# Patient Record
Sex: MALE | Race: WHITE | NOT HISPANIC OR LATINO | URBAN - METROPOLITAN AREA
[De-identification: names, ages, dates, MRNs, and addresses within clinical notes are randomized per-mention and may not be internally consistent; named-entity substitution may affect disease eponyms.]

---

## 2017-01-10 ENCOUNTER — OFFICE VISIT - RIVER FALLS (OUTPATIENT)
Dept: FAMILY MEDICINE | Facility: CLINIC | Age: 57
End: 2017-01-10

## 2017-01-10 ASSESSMENT — MIFFLIN-ST. JEOR: SCORE: 2165.24

## 2017-01-24 ENCOUNTER — OFFICE VISIT - RIVER FALLS (OUTPATIENT)
Dept: FAMILY MEDICINE | Facility: CLINIC | Age: 57
End: 2017-01-24

## 2017-01-24 ASSESSMENT — MIFFLIN-ST. JEOR: SCORE: 2165.24

## 2017-05-10 ENCOUNTER — OFFICE VISIT - RIVER FALLS (OUTPATIENT)
Dept: FAMILY MEDICINE | Facility: CLINIC | Age: 57
End: 2017-05-10

## 2017-09-01 ENCOUNTER — OFFICE VISIT - RIVER FALLS (OUTPATIENT)
Dept: FAMILY MEDICINE | Facility: CLINIC | Age: 57
End: 2017-09-01

## 2017-09-01 ASSESSMENT — MIFFLIN-ST. JEOR: SCORE: 2200.62

## 2017-09-14 ENCOUNTER — OFFICE VISIT - RIVER FALLS (OUTPATIENT)
Dept: FAMILY MEDICINE | Facility: CLINIC | Age: 57
End: 2017-09-14

## 2017-09-29 ENCOUNTER — OFFICE VISIT - RIVER FALLS (OUTPATIENT)
Dept: FAMILY MEDICINE | Facility: CLINIC | Age: 57
End: 2017-09-29

## 2018-03-20 ENCOUNTER — OFFICE VISIT - RIVER FALLS (OUTPATIENT)
Dept: FAMILY MEDICINE | Facility: CLINIC | Age: 58
End: 2018-03-20

## 2018-03-20 ASSESSMENT — MIFFLIN-ST. JEOR: SCORE: 2216.95

## 2018-03-21 LAB
CHOLEST SERPL-MCNC: 172 MG/DL
CHOLEST/HDLC SERPL: 4.8 {RATIO}
CREAT SERPL-MCNC: 0.9 MG/DL (ref 0.7–1.33)
GLUCOSE BLD-MCNC: 91 MG/DL (ref 65–99)
HDLC SERPL-MCNC: 36 MG/DL
LDLC SERPL CALC-MCNC: 93 MG/DL
NONHDLC SERPL-MCNC: 136 MG/DL
PSA SERPL-MCNC: 0.6 NG/ML
TRIGL SERPL-MCNC: 322 MG/DL

## 2018-07-23 ENCOUNTER — OFFICE VISIT - RIVER FALLS (OUTPATIENT)
Dept: FAMILY MEDICINE | Facility: CLINIC | Age: 58
End: 2018-07-23

## 2018-07-23 ASSESSMENT — MIFFLIN-ST. JEOR: SCORE: 2215.14

## 2018-09-19 ENCOUNTER — OFFICE VISIT - RIVER FALLS (OUTPATIENT)
Dept: FAMILY MEDICINE | Facility: CLINIC | Age: 58
End: 2018-09-19

## 2018-09-19 ASSESSMENT — MIFFLIN-ST. JEOR: SCORE: 2196.99

## 2018-10-15 ENCOUNTER — OFFICE VISIT - RIVER FALLS (OUTPATIENT)
Dept: FAMILY MEDICINE | Facility: CLINIC | Age: 58
End: 2018-10-15

## 2018-10-16 ENCOUNTER — OFFICE VISIT - RIVER FALLS (OUTPATIENT)
Dept: FAMILY MEDICINE | Facility: CLINIC | Age: 58
End: 2018-10-16

## 2018-10-16 ASSESSMENT — MIFFLIN-ST. JEOR: SCORE: 2196.99

## 2018-10-26 ENCOUNTER — OFFICE VISIT - RIVER FALLS (OUTPATIENT)
Dept: FAMILY MEDICINE | Facility: CLINIC | Age: 58
End: 2018-10-26

## 2019-02-05 ENCOUNTER — OFFICE VISIT - RIVER FALLS (OUTPATIENT)
Dept: FAMILY MEDICINE | Facility: CLINIC | Age: 59
End: 2019-02-05

## 2019-02-05 ASSESSMENT — MIFFLIN-ST. JEOR: SCORE: 2230.56

## 2019-02-06 LAB
A/G RATIO - HISTORICAL: 1.3 (ref 1–2.5)
ALBUMIN SERPL-MCNC: 4.1 GM/DL (ref 3.6–5.1)
ALP SERPL-CCNC: 77 UNIT/L (ref 40–115)
ALT SERPL W P-5'-P-CCNC: 26 UNIT/L (ref 9–46)
AST SERPL W P-5'-P-CCNC: 22 UNIT/L (ref 10–35)
BILIRUB SERPL-MCNC: 0.6 MG/DL (ref 0.2–1.2)
BUN SERPL-MCNC: 20 MG/DL (ref 7–25)
BUN/CREAT RATIO - HISTORICAL: ABNORMAL (ref 6–22)
CALCIUM SERPL-MCNC: 9 MG/DL (ref 8.6–10.3)
CHLORIDE BLD-SCNC: 109 MMOL/L (ref 98–110)
CHOLEST SERPL-MCNC: 150 MG/DL
CHOLEST/HDLC SERPL: 3.8 {RATIO}
CO2 SERPL-SCNC: 21 MMOL/L (ref 20–32)
CREAT SERPL-MCNC: 0.74 MG/DL (ref 0.7–1.33)
EGFRCR SERPLBLD CKD-EPI 2021: 102 ML/MIN/1.73M2
ERYTHROCYTE [DISTWIDTH] IN BLOOD BY AUTOMATED COUNT: 13.7 % (ref 11–15)
GLOBULIN: 3.1 (ref 1.9–3.7)
GLUCOSE BLD-MCNC: 100 MG/DL (ref 65–99)
HCT VFR BLD AUTO: 40.2 % (ref 38.5–50)
HDLC SERPL-MCNC: 39 MG/DL
HGB BLD-MCNC: 13.9 GM/DL (ref 13.2–17.1)
LDLC SERPL CALC-MCNC: 67 MG/DL
MCH RBC QN AUTO: 29.1 PG (ref 27–33)
MCHC RBC AUTO-ENTMCNC: 34.6 GM/DL (ref 32–36)
MCV RBC AUTO: 84.3 FL (ref 80–100)
NONHDLC SERPL-MCNC: 111 MG/DL
PLATELET # BLD AUTO: 157 10*3/UL (ref 140–400)
PMV BLD: 11.4 FL (ref 7.5–12.5)
POTASSIUM BLD-SCNC: 4.1 MMOL/L (ref 3.5–5.3)
PROT SERPL-MCNC: 7.2 GM/DL (ref 6.1–8.1)
PSA SERPL-MCNC: 0.6 NG/ML
RBC # BLD AUTO: 4.77 10*6/UL (ref 4.2–5.8)
SODIUM SERPL-SCNC: 142 MMOL/L (ref 135–146)
TRIGL SERPL-MCNC: 368 MG/DL
WBC # BLD AUTO: 4.7 10*3/UL (ref 3.8–10.8)

## 2019-03-25 ENCOUNTER — OFFICE VISIT - RIVER FALLS (OUTPATIENT)
Dept: FAMILY MEDICINE | Facility: CLINIC | Age: 59
End: 2019-03-25

## 2019-09-04 ENCOUNTER — COMMUNICATION - RIVER FALLS (OUTPATIENT)
Dept: FAMILY MEDICINE | Facility: CLINIC | Age: 59
End: 2019-09-04

## 2019-09-16 ENCOUNTER — COMMUNICATION - RIVER FALLS (OUTPATIENT)
Dept: FAMILY MEDICINE | Facility: CLINIC | Age: 59
End: 2019-09-16

## 2019-09-16 ENCOUNTER — OFFICE VISIT - RIVER FALLS (OUTPATIENT)
Dept: FAMILY MEDICINE | Facility: CLINIC | Age: 59
End: 2019-09-16

## 2019-09-16 ASSESSMENT — MIFFLIN-ST. JEOR: SCORE: 2228.74

## 2019-09-17 ENCOUNTER — COMMUNICATION - RIVER FALLS (OUTPATIENT)
Dept: FAMILY MEDICINE | Facility: CLINIC | Age: 59
End: 2019-09-17

## 2019-09-17 LAB
A/G RATIO - HISTORICAL: 1.4 (ref 1–2.5)
ALBUMIN SERPL-MCNC: 4 GM/DL (ref 3.6–5.1)
ALP SERPL-CCNC: 81 UNIT/L (ref 40–115)
ALT SERPL W P-5'-P-CCNC: 18 UNIT/L (ref 9–46)
AST SERPL W P-5'-P-CCNC: 17 UNIT/L (ref 10–35)
BILIRUB SERPL-MCNC: 0.6 MG/DL (ref 0.2–1.2)
BUN SERPL-MCNC: 20 MG/DL (ref 7–25)
BUN/CREAT RATIO - HISTORICAL: NORMAL (ref 6–22)
CALCIUM SERPL-MCNC: 8.8 MG/DL (ref 8.6–10.3)
CHLORIDE BLD-SCNC: 108 MMOL/L (ref 98–110)
CHOLEST SERPL-MCNC: 202 MG/DL
CHOLEST/HDLC SERPL: 5.8 {RATIO}
CO2 SERPL-SCNC: 23 MMOL/L (ref 20–32)
CREAT SERPL-MCNC: 0.86 MG/DL (ref 0.7–1.33)
EGFRCR SERPLBLD CKD-EPI 2021: 96 ML/MIN/1.73M2
ERYTHROCYTE [DISTWIDTH] IN BLOOD BY AUTOMATED COUNT: 13.8 % (ref 11–15)
GLOBULIN: 2.9 (ref 1.9–3.7)
GLUCOSE BLD-MCNC: 92 MG/DL (ref 65–99)
HCT VFR BLD AUTO: 40.4 % (ref 38.5–50)
HDLC SERPL-MCNC: 35 MG/DL
HGB BLD-MCNC: 14.1 GM/DL (ref 13.2–17.1)
LDLC SERPL CALC-MCNC: 51 MG/DL
LDLC SERPL CALC-MCNC: ABNORMAL MG/DL
MCH RBC QN AUTO: 30.1 PG (ref 27–33)
MCHC RBC AUTO-ENTMCNC: 34.9 GM/DL (ref 32–36)
MCV RBC AUTO: 86.1 FL (ref 80–100)
NONHDLC SERPL-MCNC: 167 MG/DL
PLATELET # BLD AUTO: 169 10*3/UL (ref 140–400)
PMV BLD: 10.7 FL (ref 7.5–12.5)
POTASSIUM BLD-SCNC: 3.8 MMOL/L (ref 3.5–5.3)
PROT SERPL-MCNC: 6.9 GM/DL (ref 6.1–8.1)
RBC # BLD AUTO: 4.69 10*6/UL (ref 4.2–5.8)
SODIUM SERPL-SCNC: 139 MMOL/L (ref 135–146)
TRIGL SERPL-MCNC: 935 MG/DL
WBC # BLD AUTO: 5 10*3/UL (ref 3.8–10.8)

## 2020-01-23 ENCOUNTER — OFFICE VISIT - RIVER FALLS (OUTPATIENT)
Dept: FAMILY MEDICINE | Facility: CLINIC | Age: 60
End: 2020-01-23

## 2020-01-23 ENCOUNTER — AMBULATORY - RIVER FALLS (OUTPATIENT)
Dept: FAMILY MEDICINE | Facility: CLINIC | Age: 60
End: 2020-01-23

## 2020-01-23 ASSESSMENT — MIFFLIN-ST. JEOR: SCORE: 2228.74

## 2020-01-24 LAB
A/G RATIO - HISTORICAL: 1.5 (ref 1–2.5)
ALBUMIN SERPL-MCNC: 4.4 GM/DL (ref 3.6–5.1)
ALP SERPL-CCNC: 71 UNIT/L (ref 40–115)
ALT SERPL W P-5'-P-CCNC: 16 UNIT/L (ref 9–46)
AST SERPL W P-5'-P-CCNC: 16 UNIT/L (ref 10–35)
BILIRUB SERPL-MCNC: 0.6 MG/DL (ref 0.2–1.2)
BUN SERPL-MCNC: 23 MG/DL (ref 7–25)
BUN/CREAT RATIO - HISTORICAL: ABNORMAL (ref 6–22)
CALCIUM SERPL-MCNC: 9.3 MG/DL (ref 8.6–10.3)
CHLORIDE BLD-SCNC: 105 MMOL/L (ref 98–110)
CHOLEST SERPL-MCNC: 174 MG/DL
CHOLEST/HDLC SERPL: 4 {RATIO}
CO2 SERPL-SCNC: 26 MMOL/L (ref 20–32)
CREAT SERPL-MCNC: 0.93 MG/DL (ref 0.7–1.33)
EGFRCR SERPLBLD CKD-EPI 2021: 90 ML/MIN/1.73M2
GLOBULIN: 2.9 (ref 1.9–3.7)
GLUCOSE BLD-MCNC: 103 MG/DL (ref 65–99)
HDLC SERPL-MCNC: 44 MG/DL
LDLC SERPL CALC-MCNC: 98 MG/DL
NONHDLC SERPL-MCNC: 130 MG/DL
POTASSIUM BLD-SCNC: 3.9 MMOL/L (ref 3.5–5.3)
PROT SERPL-MCNC: 7.3 GM/DL (ref 6.1–8.1)
PSA SERPL-MCNC: 0.8 NG/ML
SODIUM SERPL-SCNC: 138 MMOL/L (ref 135–146)
TRIGL SERPL-MCNC: 197 MG/DL

## 2020-01-27 ENCOUNTER — COMMUNICATION - RIVER FALLS (OUTPATIENT)
Dept: FAMILY MEDICINE | Facility: CLINIC | Age: 60
End: 2020-01-27

## 2020-01-27 ENCOUNTER — OFFICE VISIT - RIVER FALLS (OUTPATIENT)
Dept: FAMILY MEDICINE | Facility: CLINIC | Age: 60
End: 2020-01-27

## 2020-01-27 ASSESSMENT — MIFFLIN-ST. JEOR: SCORE: 2228.74

## 2020-01-28 ENCOUNTER — OFFICE VISIT - RIVER FALLS (OUTPATIENT)
Dept: FAMILY MEDICINE | Facility: CLINIC | Age: 60
End: 2020-01-28

## 2020-01-28 ASSESSMENT — MIFFLIN-ST. JEOR: SCORE: 2228.74

## 2020-02-17 ENCOUNTER — OFFICE VISIT - RIVER FALLS (OUTPATIENT)
Dept: FAMILY MEDICINE | Facility: CLINIC | Age: 60
End: 2020-02-17

## 2020-02-17 ASSESSMENT — MIFFLIN-ST. JEOR: SCORE: 2228.74

## 2020-03-02 ENCOUNTER — COMMUNICATION - RIVER FALLS (OUTPATIENT)
Dept: FAMILY MEDICINE | Facility: CLINIC | Age: 60
End: 2020-03-02

## 2020-04-06 ENCOUNTER — COMMUNICATION - RIVER FALLS (OUTPATIENT)
Dept: FAMILY MEDICINE | Facility: CLINIC | Age: 60
End: 2020-04-06

## 2020-06-24 ENCOUNTER — OFFICE VISIT - RIVER FALLS (OUTPATIENT)
Dept: FAMILY MEDICINE | Facility: CLINIC | Age: 60
End: 2020-06-24

## 2020-06-24 ASSESSMENT — MIFFLIN-ST. JEOR: SCORE: 2195.18

## 2020-08-17 ENCOUNTER — OFFICE VISIT - RIVER FALLS (OUTPATIENT)
Dept: FAMILY MEDICINE | Facility: CLINIC | Age: 60
End: 2020-08-17

## 2020-08-17 ENCOUNTER — COMMUNICATION - RIVER FALLS (OUTPATIENT)
Dept: FAMILY MEDICINE | Facility: CLINIC | Age: 60
End: 2020-08-17

## 2020-08-17 ASSESSMENT — MIFFLIN-ST. JEOR: SCORE: 2192.46

## 2020-10-06 ENCOUNTER — COMMUNICATION - RIVER FALLS (OUTPATIENT)
Dept: FAMILY MEDICINE | Facility: CLINIC | Age: 60
End: 2020-10-06

## 2020-10-27 ENCOUNTER — OFFICE VISIT - RIVER FALLS (OUTPATIENT)
Dept: FAMILY MEDICINE | Facility: CLINIC | Age: 60
End: 2020-10-27

## 2020-10-27 ASSESSMENT — MIFFLIN-ST. JEOR: SCORE: 2192.46

## 2020-10-28 ENCOUNTER — COMMUNICATION - RIVER FALLS (OUTPATIENT)
Dept: FAMILY MEDICINE | Facility: CLINIC | Age: 60
End: 2020-10-28

## 2020-10-28 LAB
A/G RATIO - HISTORICAL: 1.5 (ref 1–2.5)
ALBUMIN SERPL-MCNC: 4.5 GM/DL (ref 3.6–5.1)
ALP SERPL-CCNC: 80 UNIT/L (ref 35–144)
ALT SERPL W P-5'-P-CCNC: 19 UNIT/L (ref 9–46)
AST SERPL W P-5'-P-CCNC: 17 UNIT/L (ref 10–35)
BILIRUB SERPL-MCNC: 0.5 MG/DL (ref 0.2–1.2)
BUN SERPL-MCNC: 18 MG/DL (ref 7–25)
BUN/CREAT RATIO - HISTORICAL: ABNORMAL (ref 6–22)
CALCIUM SERPL-MCNC: 9.4 MG/DL (ref 8.6–10.3)
CHLORIDE BLD-SCNC: 104 MMOL/L (ref 98–110)
CHOLEST SERPL-MCNC: 160 MG/DL
CHOLEST/HDLC SERPL: 3.5 {RATIO}
CO2 SERPL-SCNC: 24 MMOL/L (ref 20–32)
CREAT SERPL-MCNC: 0.84 MG/DL (ref 0.7–1.33)
EGFRCR SERPLBLD CKD-EPI 2021: 96 ML/MIN/1.73M2
ERYTHROCYTE [DISTWIDTH] IN BLOOD BY AUTOMATED COUNT: 12.8 % (ref 11–15)
GLOBULIN: 3.1 (ref 1.9–3.7)
GLUCOSE BLD-MCNC: 102 MG/DL (ref 65–99)
HCT VFR BLD AUTO: 40.7 % (ref 38.5–50)
HDLC SERPL-MCNC: 46 MG/DL
HGB BLD-MCNC: 14.4 GM/DL (ref 13.2–17.1)
LDLC SERPL CALC-MCNC: 81 MG/DL
MCH RBC QN AUTO: 29.8 PG (ref 27–33)
MCHC RBC AUTO-ENTMCNC: 35.4 GM/DL (ref 32–36)
MCV RBC AUTO: 84.3 FL (ref 80–100)
NONHDLC SERPL-MCNC: 114 MG/DL
PLATELET # BLD AUTO: 231 10*3/UL (ref 140–400)
PMV BLD: 11.2 FL (ref 7.5–12.5)
POTASSIUM BLD-SCNC: 4.1 MMOL/L (ref 3.5–5.3)
PROT SERPL-MCNC: 7.6 GM/DL (ref 6.1–8.1)
RBC # BLD AUTO: 4.83 10*6/UL (ref 4.2–5.8)
SODIUM SERPL-SCNC: 138 MMOL/L (ref 135–146)
TRIGL SERPL-MCNC: 249 MG/DL
TSH SERPL DL<=0.005 MIU/L-ACNC: 3.59 MIU/L (ref 0.4–4.5)
WBC # BLD AUTO: 4.7 10*3/UL (ref 3.8–10.8)

## 2020-11-23 ENCOUNTER — OFFICE VISIT - RIVER FALLS (OUTPATIENT)
Dept: FAMILY MEDICINE | Facility: CLINIC | Age: 60
End: 2020-11-23

## 2020-11-23 ASSESSMENT — MIFFLIN-ST. JEOR: SCORE: 2187.92

## 2021-02-16 ENCOUNTER — OFFICE VISIT - RIVER FALLS (OUTPATIENT)
Dept: FAMILY MEDICINE | Facility: CLINIC | Age: 61
End: 2021-02-16

## 2021-02-16 ASSESSMENT — MIFFLIN-ST. JEOR: SCORE: 2161.61

## 2021-02-17 ENCOUNTER — COMMUNICATION - RIVER FALLS (OUTPATIENT)
Dept: FAMILY MEDICINE | Facility: CLINIC | Age: 61
End: 2021-02-17

## 2021-02-17 LAB — PSA SERPL-MCNC: 0.6 NG/ML

## 2022-02-12 VITALS
HEART RATE: 76 BPM | BODY MASS INDEX: 42.9 KG/M2 | DIASTOLIC BLOOD PRESSURE: 86 MMHG | SYSTOLIC BLOOD PRESSURE: 130 MMHG | WEIGHT: 306.4 LBS | HEIGHT: 71 IN

## 2022-02-12 VITALS
BODY MASS INDEX: 41.3 KG/M2 | SYSTOLIC BLOOD PRESSURE: 136 MMHG | DIASTOLIC BLOOD PRESSURE: 82 MMHG | SYSTOLIC BLOOD PRESSURE: 124 MMHG | BODY MASS INDEX: 41.3 KG/M2 | WEIGHT: 295 LBS | HEIGHT: 71 IN | DIASTOLIC BLOOD PRESSURE: 80 MMHG | WEIGHT: 295 LBS | HEIGHT: 71 IN | HEART RATE: 72 BPM

## 2022-02-12 VITALS
SYSTOLIC BLOOD PRESSURE: 122 MMHG | HEART RATE: 78 BPM | BODY MASS INDEX: 43.26 KG/M2 | DIASTOLIC BLOOD PRESSURE: 62 MMHG | BODY MASS INDEX: 43.26 KG/M2 | SYSTOLIC BLOOD PRESSURE: 128 MMHG | HEART RATE: 70 BPM | HEIGHT: 71 IN | WEIGHT: 309 LBS | DIASTOLIC BLOOD PRESSURE: 72 MMHG | OXYGEN SATURATION: 98 % | OXYGEN SATURATION: 97 % | HEIGHT: 71 IN | DIASTOLIC BLOOD PRESSURE: 70 MMHG | WEIGHT: 309 LBS | SYSTOLIC BLOOD PRESSURE: 136 MMHG | HEIGHT: 71 IN | WEIGHT: 309 LBS | BODY MASS INDEX: 43.26 KG/M2 | OXYGEN SATURATION: 96 % | WEIGHT: 309 LBS | BODY MASS INDEX: 43.26 KG/M2 | HEIGHT: 71 IN | HEART RATE: 71 BPM

## 2022-02-12 VITALS
OXYGEN SATURATION: 98 % | HEIGHT: 71 IN | BODY MASS INDEX: 42.14 KG/M2 | HEART RATE: 84 BPM | SYSTOLIC BLOOD PRESSURE: 146 MMHG | DIASTOLIC BLOOD PRESSURE: 78 MMHG | WEIGHT: 301 LBS

## 2022-02-12 VITALS
OXYGEN SATURATION: 97 % | HEIGHT: 71 IN | WEIGHT: 309.4 LBS | HEART RATE: 66 BPM | BODY MASS INDEX: 43.31 KG/M2 | DIASTOLIC BLOOD PRESSURE: 88 MMHG | SYSTOLIC BLOOD PRESSURE: 136 MMHG

## 2022-02-12 VITALS
DIASTOLIC BLOOD PRESSURE: 70 MMHG | SYSTOLIC BLOOD PRESSURE: 136 MMHG | HEIGHT: 71 IN | WEIGHT: 294.2 LBS | BODY MASS INDEX: 41.19 KG/M2 | HEART RATE: 99 BPM | OXYGEN SATURATION: 97 %

## 2022-02-12 VITALS
BODY MASS INDEX: 42.54 KG/M2 | HEIGHT: 71 IN | SYSTOLIC BLOOD PRESSURE: 126 MMHG | HEART RATE: 79 BPM | HEART RATE: 70 BPM | BODY MASS INDEX: 42.28 KG/M2 | WEIGHT: 305 LBS | OXYGEN SATURATION: 97 % | DIASTOLIC BLOOD PRESSURE: 74 MMHG | SYSTOLIC BLOOD PRESSURE: 122 MMHG | HEIGHT: 71 IN | DIASTOLIC BLOOD PRESSURE: 64 MMHG | SYSTOLIC BLOOD PRESSURE: 130 MMHG | OXYGEN SATURATION: 96 % | BODY MASS INDEX: 42.28 KG/M2 | WEIGHT: 302 LBS | DIASTOLIC BLOOD PRESSURE: 74 MMHG | TEMPERATURE: 98.7 F | HEART RATE: 84 BPM | OXYGEN SATURATION: 96 % | WEIGHT: 302 LBS

## 2022-02-12 VITALS
HEART RATE: 74 BPM | WEIGHT: 302.8 LBS | BODY MASS INDEX: 42.39 KG/M2 | HEIGHT: 71 IN | SYSTOLIC BLOOD PRESSURE: 116 MMHG | DIASTOLIC BLOOD PRESSURE: 80 MMHG | TEMPERATURE: 98.9 F

## 2022-02-12 VITALS
HEIGHT: 71 IN | WEIGHT: 306 LBS | SYSTOLIC BLOOD PRESSURE: 164 MMHG | BODY MASS INDEX: 42.84 KG/M2 | DIASTOLIC BLOOD PRESSURE: 90 MMHG | HEART RATE: 75 BPM

## 2022-02-12 VITALS
DIASTOLIC BLOOD PRESSURE: 82 MMHG | WEIGHT: 309 LBS | HEIGHT: 71 IN | HEART RATE: 66 BPM | SYSTOLIC BLOOD PRESSURE: 122 MMHG | OXYGEN SATURATION: 97 % | BODY MASS INDEX: 43.26 KG/M2

## 2022-02-12 VITALS
OXYGEN SATURATION: 98 % | DIASTOLIC BLOOD PRESSURE: 78 MMHG | BODY MASS INDEX: 42.14 KG/M2 | HEIGHT: 71 IN | WEIGHT: 301 LBS | TEMPERATURE: 97.5 F | HEIGHT: 71 IN | SYSTOLIC BLOOD PRESSURE: 122 MMHG | HEART RATE: 67 BPM | DIASTOLIC BLOOD PRESSURE: 66 MMHG | SYSTOLIC BLOOD PRESSURE: 130 MMHG | BODY MASS INDEX: 42.22 KG/M2 | TEMPERATURE: 97.4 F | HEART RATE: 87 BPM | WEIGHT: 301.6 LBS

## 2022-02-12 VITALS
DIASTOLIC BLOOD PRESSURE: 82 MMHG | SYSTOLIC BLOOD PRESSURE: 129 MMHG | WEIGHT: 302.8 LBS | TEMPERATURE: 98.1 F | BODY MASS INDEX: 42.23 KG/M2 | HEART RATE: 75 BPM

## 2022-02-12 VITALS
DIASTOLIC BLOOD PRESSURE: 74 MMHG | BODY MASS INDEX: 42 KG/M2 | OXYGEN SATURATION: 95 % | HEIGHT: 71 IN | TEMPERATURE: 97.7 F | WEIGHT: 300 LBS | SYSTOLIC BLOOD PRESSURE: 140 MMHG | HEART RATE: 65 BPM

## 2022-02-12 VITALS
DIASTOLIC BLOOD PRESSURE: 76 MMHG | BODY MASS INDEX: 42.48 KG/M2 | SYSTOLIC BLOOD PRESSURE: 124 MMHG | HEART RATE: 68 BPM | WEIGHT: 304.6 LBS

## 2022-02-15 NOTE — NURSING NOTE
Comprehensive Intake Entered On:  10/27/2020 10:58 AM CDT    Performed On:  10/27/2020 10:54 AM CDT by Sarah Cervantes CMA               Summary   Chief Complaint :   Pt here for WC pre op... DOS 11/16/20 at [facility] with Dr Tinoco for left knee replacement   Weight Measured :   301 lb(Converted to: 301 lb 0 oz, 136.531 kg)    Height Measured :   71 in(Converted to: 5 ft 11 in, 180.34 cm)    Body Mass Index :   41.98 kg/m2 (HI)    Body Surface Area :   2.61 m2   Height/Length Estimated :   71 in(Converted to: 5 ft 11 in, 180.34 cm)    Systolic Blood Pressure :   146 mmHg (HI)    Diastolic Blood Pressure :   78 mmHg   Mean Arterial Pressure :   101 mmHg   Peripheral Pulse Rate :   84 bpm   Oxygen Saturation :   98 %   Race :      Languages :   English   Ethnicity :   Not  or    Sarah Cervantes CMA - 10/27/2020 10:54 AM CDT   Health Status   Allergies Verified? :   Yes   Medication History Verified? :   Yes   Immunizations Current :   Yes   Medical History Verified? :   Yes   Pre-Visit Planning Status :   Completed   Tobacco Use? :   Former smoker   Sarah Cervantes CMA - 10/27/2020 10:54 AM CDT   Consents   Consent for Immunization Exchange :   Consent Granted   Consent for Immunizations to Providers :   Consent Granted   Sarah Cervantes CMA - 10/27/2020 10:54 AM CDT   Meds / Allergies   (As Of: 10/27/2020 10:58:47 AM CDT)   Allergies (Active)   No Known Medication Allergies  Estimated Onset Date:   Unspecified ; Created By:   Elise Lubin LPN; Reaction Status:   Active ; Category:   Drug ; Substance:   No Known Medication Allergies ; Type:   Allergy ; Updated By:   Elise Lubin LPN; Reviewed Date:   10/27/2020 10:55 AM CDT        Medication List   (As Of: 10/27/2020 10:58:47 AM CDT)   Prescription/Discharge Order    atorvastatin  :   atorvastatin ; Status:   Prescribed ; Ordered As Mnemonic:   atorvastatin 20 mg oral tablet ; Simple Display Line:   20 mg, 1 tab(s), Oral, daily, 90  tab(s), 1 Refill(s) ; Ordering Provider:   Castillo Nogueira MD; Catalog Code:   atorvastatin ; Order Dt/Tm:   10/6/2020 10:16:26 AM CDT          cephalexin  :   cephalexin ; Status:   Processing ; Ordered As Mnemonic:   cephalexin 500 mg oral capsule ; Ordering Provider:   Castillo Nogueira MD; Action Display:   Complete ; Catalog Code:   cephalexin ; Order Dt/Tm:   10/27/2020 10:55:07 AM CDT          gemfibrozil  :   gemfibrozil ; Status:   Prescribed ; Ordered As Mnemonic:   gemfibrozil 600 mg oral tablet ; Simple Display Line:   See Instructions, TAKE ONE (1) TABLET BY MOUTH TWO (2) TIMES, 180 tab(s), 0 Refill(s) ; Ordering Provider:   Castillo Nogueira MD; Catalog Code:   gemfibrozil ; Order Dt/Tm:   8/6/2020 1:17:38 PM CDT          losartan  :   losartan ; Status:   Prescribed ; Ordered As Mnemonic:   losartan 50 mg oral tablet ; Simple Display Line:   50 mg, 1 tab(s), Oral, daily, 90 tab(s), 1 Refill(s) ; Ordering Provider:   Castillo Nogueira MD; Catalog Code:   losartan ; Order Dt/Tm:   8/17/2020 4:05:55 PM CDT          sildenafil  :   sildenafil ; Status:   Prescribed ; Ordered As Mnemonic:   sildenafil 20 mg oral tablet ; Simple Display Line:   40 mg, 2 tab(s), Oral, prn, as needed for planned sexual activity, PRN: Other (see comment), 20 tab(s), 5 Refill(s) ; Ordering Provider:   Castillo Nogueira MD; Catalog Code:   sildenafil ; Order Dt/Tm:   9/24/2019 2:56:03 PM CDT          sulfamethoxazole-trimethoprim  :   sulfamethoxazole-trimethoprim ; Status:   Processing ; Ordered As Mnemonic:   sulfamethoxazole-trimethoprim 800 mg-160 mg oral tablet ; Ordering Provider:   Castillo Nogueira MD; Action Display:   Complete ; Catalog Code:   sulfamethoxazole-trimethoprim ; Order Dt/Tm:   10/27/2020 10:55:07 AM CDT            ID Risk Screen   Recent Travel History :   No recent travel   Family Member Travel History :   No recent travel   Other Exposure to Infectious Disease :   Unknown   Sarah Cervantes CMA - 10/27/2020 10:54 AM  CDT

## 2022-02-15 NOTE — PROGRESS NOTES
Patient:   YAZMIN GRANADOS            MRN: [id number]            FIN: [id number]               Age:   60 years     Sex:  Male     :  1960   Associated Diagnoses:   Cellulitis   Author:   Castillo Nogueira MD      Impression and Plan   Diagnosis     Cellulitis (OIX26-VW L03.90).     Course:  Worsening.    Orders     Orders   Charges (Evaluation and Management):  20664 office outpatient visit 25 minutes (Charge) (Order): Quantity: 1, Cellulitis.     Orders (Selected)   Prescriptions  Prescribed  cephalexin 500 mg oral capsule: = 1 cap(s) ( 500 mg ), PO, qid, # 56 cap(s), 0 Refill(s), Type: Maintenance, Pharmacy: [facility], 1 cap(s) Oral qid,x14 day(s), 71, in, 20 15:40:00 CDT, Height Measured, 301, lb, 20 15:40:00 CDT, Weight Measured  sulfamethoxazole-trimethoprim 800 mg-160 mg oral tablet: 1 tab(s), PO, BID, # 28 tab(s), 0 Refill(s), Type: Maintenance, Pharmacy: [facility], 1 tab(s) Oral bid,x14 day(s), 71, in, 20 15:40:00 CDT, Height Measured, 301, lb, 20 15:40:00 CDT, Weight Measured.        Visit Information      Date of Service: 2020 03:35 pm  Performing Location: [facility]  Encounter#: [id number]      Primary Care Provider (PCP):  Castillo Nogueira MD    NPI# [id number]      Referring Provider:  Castillo Nogueira MD, NPI# [id number]   Visit type:  New symptom.    Accompanied by:  No one.    Source of history:  Self.    History limitation:  None.       Chief Complaint   2020 3:40 PM CDT    Pt here for cellulitis        History of Present Illness             The patient presents with skin lesion(s).  Associated symptoms consist of change in skin color, denies fatigue and denies fever.        Review of Systems   Constitutional:  No fever, No chills, No sweats, No weakness, No fatigue, No decreased activity.    Eye:  Negative.    Ear/Nose/Mouth/Throat:  Negative.    Respiratory:  Negative.    Cardiovascular:  No peripheral edema.    Gastrointestinal:   Negative.    Genitourinary:  Negative.    Hematology/Lymphatics:  No swollen lymph glands.    Endocrine:  Negative.    Immunologic:  Not immunocompromised, No recurrent fevers, No recurrent infections, No malaise.    Musculoskeletal:  Negative.    Integumentary:  Negative except as documented in history of present illness.    Neurologic:  No numbness, No tingling.    Psychiatric:  Negative.    All other systems reviewed and negative      Health Status   Allergies:    Allergic Reactions (Selected)  No Known Medication Allergies   Medications:  (Selected)   Prescriptions  Prescribed  atorvastatin 20 mg oral tablet: = 1 tab(s) ( 20 mg ), Oral, daily, # 90 tab(s), 1 Refill(s), Type: Maintenance, Pharmacy: [facility]  cephalexin 500 mg oral capsule: = 1 cap(s) ( 500 mg ), PO, qid, # 56 cap(s), 0 Refill(s), Type: Maintenance, Pharmacy: [facility], 1 cap(s) Oral qid,x14 day(s), 71, in, 08/17/20 15:40:00 CDT, Height Measured, 301, lb, 08/17/20 15:40:00 CDT, Weight Measured  gemfibrozil 600 mg oral tablet: See Instructions, Instructions: TAKE ONE (1) TABLET BY MOUTH TWO (2) TIMES, # 180 tab(s), 0 Refill(s), Type: Maintenance, Pharmacy: [facility], 71, in, 06/24/20 10:35:00 CDT, Height Measured, 301.6, lb, 06/24/20 10:35:00 CDT, Weight Measured  losartan 50 mg oral tablet: = 1 tab(s) ( 50 mg ), Oral, daily, # 90 tab(s), 1 Refill(s), Type: Maintenance, Pharmacy: [facility]  sildenafil 20 mg oral tablet: = 2 tab(s) ( 40 mg ), Oral, prn, Instructions: as needed for planned sexual activity, PRN: Other (see comment), # 20 tab(s), 5 Refill(s), Type: Maintenance, Pharmacy: [facility], 2 tab(s) Oral prn,PRN:Other (see comment),Instr:as needed for pl...  sulfamethoxazole-trimethoprim 800 mg-160 mg oral tablet: 1 tab(s), PO, BID, # 28 tab(s), 0 Refill(s), Type: Maintenance, Pharmacy: [facility], 1 tab(s) Oral bid,x14 day(s), 71, in, 08/17/20 15:40:00 CDT, Height Measured, 301, lb,  08/17/20 15:40:00 CDT, Weight Measured   Problem list:    All Problems  Anal Fissure / ICD-9-.0 / Confirmed  Benign essential HTN / SNOMED CT 3666231 / Confirmed  CAD (coronary artery disease) / SNOMED CT 0620979386 / Confirmed  Cervical radiculopathy at C6 / SNOMED CT 404342019 / Confirmed  Erectile Dysfunction / SNOMED CT 854798981 / Confirmed  Hypertriglyceridemia / ICD-9-.1 / Confirmed  Morbid obesity / SNOMED CT 323760534 / Confirmed  Obese / ICD-9-.00 / Probable  Osteoarthritis / ICD-9-.90 / Confirmed  Inactive: GERD (Gastroesophageal Reflux Disease) / ICD-9-.81  Resolved: *Hospitalized@[facility] - Cellulitis LLE  Resolved: Arthroscopy of knee / SNOMED CT 179977542  Resolved: Arthroscopy of shoulder / SNOMED CT 286922650  Resolved: Hip replacement / SNOMED CT 9950571827  Resolved: Repair of diaphragmatic hiatal hernia / SNOMED CT 521627446  Resolved: Repair of rotator cuff of shoulder / SNOMED CT 5700437403  Resolved: Resection of clavicle / SNOMED CT 03994742  Canceled: Hyperlipemia / ICD-9-.4      Histories   Past Medical History:    Resolved  *Hospitalized@[facility] - Cellulitis LLE: Onset on 2/5/2012 at 52 years.  Resolved.  Repair of diaphragmatic hiatal hernia (468558557):  Resolved in 1992 at 31 years.  Comments:  6/28/2010 CDT 4:26 PM CDT - Danae Cruz  [facility]is [employer]da[employer]  Arthroscopy of knee (747826905):  Resolved in 1990 at 29 years.  Comments:  6/28/2010 CDT 4:28 PM CDT - Danae Cruz  Right knee  Resection of clavicle (30759506):  Resolved in 1996 at 35 years.  Comments:  6/28/2010 CDT 4:33 PM CDT Danae Real  Left-distal  Arthroscopy of shoulder (062945484):  Resolved in 1999 at 38 years.  Comments:  6/28/2010 CDT 4:34 PM CDT - Danae Cruz  right  Repair of rotator cuff of shoulder (7502029076):  Resolved in 2005 at 44 years.  Comments:  6/28/2010 CDT 4:37 PM CDT - Danae Cruz  left  Hip replacement (6292743761):  Resolved in 2007 at 46  years.  Comments:  2010 CDT 4:38 PM CDT Danae Real   Family History:    Cancer  Father  Diabetes mellitus  Mother ()  Hypertension  Father  Heart disease  Father  Heart attack  Mother ()     Procedure history:    Rotator cuff repair (SNOMED CT 008138961) performed by Jacques Paris MD on 10/17/2016 at 56 Years.  Comments:  10/27/2016 8:27 AM Jamaica Wong.    10/6/2016 9:16 AM Castillo Kapoor MD, Dr.  [facility]  10/17/2016   Right C5-7 Cervical foraminotomy in  at 54 Years.  Comments:  1/15/2015 10:29 AM Castillo Garg MD, Dr. [facility]  Right Hip Arthroscopy and Psoas Tenotomy in  at 53 Years.  Colonoscopy (SNOMED CT 741863933) in  at 52 Years.  Arthroscopy of Shoulder in  at 50 Years.  Comments:  2010 3:19 PM Castillo Kapoor MD, Dr.  C6-7 Laminectomy in  at 50 Years.  Comments:  2010 3:20 PM Castillo Kapoor MD, Dr. [facility]  hip replacement in  at 47 Years.  Comments:  2010 4:56 PM Danae Mccall  Right  Repair of rotator cuff of shoulder (SNOMED CT 3604995643) in  at 45 Years.  Comments:  2010 4:58 PM CDT Danae Real  left  Arthroscopy of shoulder (SNOMED CT 211503655) in  at 39 Years.  Comments:  2010 4:55 PM CDDanae Eng  Right  Resection of clavicle (SNOMED CT 55357276) in  at 36 Years.  Comments:  2010 5:00 PM CDT Danae Real distal  Repair of diaphragmatic hiatal hernia (SNOMED CT 634579914) in  at 32 Years.  Comments:  2010 4:58 PM CDT Danae Real  [employer]da[employer]  Arthroscopy of knee (SNOMED CT 662782221) in  at 30 Years.  Comments:  2010 4:54 PM CDDanae Eng   Social History:        Alcohol Assessment: Current            Current, Beer (12 oz), 1-2 times per week, 3 drinks/episode average.      Tobacco Assessment: Denies Tobacco Use      Substance Abuse  Assessment: Denies Substance Abuse      Employment and Education Assessment            Employed                     Comments:                      01/15/2015 - [employer] [place] [employer]      Home and Environment Assessment            Marital status:  (Living together).  Lives with Self, Spouse.  2 children.  Living situation:               Home/Independent.      Exercise and Physical Activity Assessment: Regular exercise            Exercise frequency: Daily.  Exercise type: Walking.        Physical Examination   Vital Signs   8/17/2020 3:40 PM CDT Temperature Tympanic 97.5 DegF  LOW    Peripheral Pulse Rate 87 bpm    Systolic Blood Pressure 130 mmHg    Diastolic Blood Pressure 66 mmHg    Mean Arterial Pressure 87 mmHg    Oxygen Saturation 98 %      Measurements from flowsheet : Measurements   8/17/2020 3:40 PM CDT Height Measured - Standard 71 in    Height/Length Estimated 71 in    Weight Measured - Standard 301 lb    BSA 2.61 m2    Body Mass Index 41.98 kg/m2  HI      General:  Alert and oriented X 3, No acute distress, Warm, Pink, Intact.         Appearance: Within normal limits, Well nourished, Calm.         Hydration: Within normal limits.         Psych: Within normal limits, Appropriate mood and affect, Cooperative, Normal judgment.    Integumentary:  Erythematous rash on left lower extremity consistent with early cellulitis.  No skin breakdown or discharge.  Tiny spot on right lower leg in the same location..

## 2022-02-15 NOTE — LETTER
(Inserted Image. Unable to display)   [address] [facility] [address]  September 17, 2019      YAZMIN GRANADOS   [address] [place] [phone]        Dear YAZMIN,     Thank you for selecting [facility] for your healthcare needs. Below you will find the results of the recent tests done at our clinic.      The only significant abnormal is the triglycerides which are very high.  I recommend adding Gemfibrozil to lower the triglycerides.  Everything else was within acceptable limits.  Let me know if you have any questions.  Recheck the blood test in 3 months to make sure things are getting better.      Result Name Current Result Previous Result Reference Range   Sodium Level (mmol/L)  139 9/16/2019  142 2/5/2019 135 - 146   Potassium Level (mmol/L)  3.8 9/16/2019  4.1 2/5/2019 3.5 - 5.3   Chloride Level (mmol/L)  108 9/16/2019  109 2/5/2019 98 - 110   CO2 Level (mmol/L)  23 9/16/2019  21 2/5/2019 20 - 32   Glucose Level (mg/dL)  92 9/16/2019 ((H)) 100 2/5/2019 65 - 99   BUN (mg/dL)  20 9/16/2019  20 2/5/2019 7 - 25   Creatinine Level (mg/dL)  0.86 9/16/2019  0.74 2/5/2019 0.70 - 1.33   BUN/Creat Ratio  NOT APPLICABLE 9/16/2019  NOT APPLICABLE 2/5/2019 6 - 22   eGFR (mL/min/1.73m2)  96 9/16/2019  102 2/5/2019 > OR = 60 -    eGFR  (mL/min/1.73m2)  111 9/16/2019  118 2/5/2019 > OR = 60 -    Calcium Level (mg/dL)  8.8 9/16/2019  9.0 2/5/2019 8.6 - 10.3   Bilirubin Total (mg/dL)  0.6 9/16/2019  0.6 2/5/2019 0.2 - 1.2   Alkaline Phosphatase (unit/L)  81 9/16/2019  77 2/5/2019 40 - 115   AST/SGOT (unit/L)  17 9/16/2019  22 2/5/2019 10 - 35   ALT/SGPT (unit/L)  18 9/16/2019  26 2/5/2019 9 - 46   Protein Total (gm/dL)  6.9 9/16/2019  7.2 2/5/2019 6.1 - 8.1   Albumin Level (gm/dL)  4.0 9/16/2019  4.1 2/5/2019 3.6 - 5.1   Globulin  2.9 9/16/2019  3.1 2/5/2019 1.9 - 3.7   A/G Ratio  1.4 9/16/2019  1.3 2/5/2019 1.0 - 2.5   Cholesterol (mg/dL) ((H)) 202 9/16/2019  150  2/5/2019  - <200   Non-HDL Cholesterol ((H)) 167 9/16/2019  111 2/5/2019  - <130   HDL (mg/dL) ((L)) 35 9/16/2019 ((L)) 39 2/5/2019 >40 -    Cholesterol/HDL Ratio ((H)) 5.8 9/16/2019  3.8 2/5/2019  - <5.0   LDL  See comment 9/16/2019  67 2/5/2019    LDL Direct (mg/dL)  51 9/16/2019   - <100   Triglyceride (mg/dL) ((H)) 935 9/16/2019 ((H)) 368 2/5/2019  - <150   WBC  5.0 9/16/2019  4.7 2/5/2019 3.8 - 10.8   RBC  4.69 9/16/2019  4.77 2/5/2019 4.20 - 5.80   Hgb (gm/dL)  14.1 9/16/2019  13.9 2/5/2019 13.2 - 17.1   Hct (%)  40.4 9/16/2019  40.2 2/5/2019 38.5 - 50.0   MCV (fL)  86.1 9/16/2019  84.3 2/5/2019 80.0 - 100.0   MCH (pg)  30.1 9/16/2019  29.1 2/5/2019 27.0 - 33.0   MCHC (gm/dL)  34.9 9/16/2019  34.6 2/5/2019 32.0 - 36.0   RDW (%)  13.8 9/16/2019  13.7 2/5/2019 11.0 - 15.0   Platelet  169 9/16/2019  157 2/5/2019 140 - 400   MPV (fL)  10.7 9/16/2019  11.4 2/5/2019 7.5 - 12.5       Please contact my practice at [phone]  if you have any questions or concerns.     Sincerely,        Castillo Nogueira MD          What do your labs mean?  Below is a glossary of commonly ordered labs:  LDL   Bad Cholesterol   HDL   Good Cholesterol  AST/ALT   Liver Function   Cr/Creatinine   Kidney Function  Microalbumin   Kidney Function  BUN   Kidney Function  PSA   Prostate    TSH   Thyroid Hormone  HgbA1c   Diabetes Test   Hgb (Hemoglobin)   Red Blood Cells

## 2022-02-15 NOTE — TELEPHONE ENCOUNTER
---------------------  From: Castillo Nogueira MD   To: YAZMIN GRANADOS    Sent: 1/27/2020 10:31:35 AM CST  Subject: Patient Message - Results Notification          All results are within acceptable limits. No treatment changes are recommended at this time.    Results:  Date Result Name Ind Value Ref Range   1/23/2020 8:39 AM Sodium Level  138 mmol/L (135 - 146)   1/23/2020 8:39 AM Potassium Level  3.9 mmol/L (3.5 - 5.3)   1/23/2020 8:39 AM Chloride Level  105 mmol/L (98 - 110)   1/23/2020 8:39 AM CO2 Level  26 mmol/L (20 - 32)   1/23/2020 8:39 AM Glucose Level ((H)) 103 mg/dL (65 - 99)   1/23/2020 8:39 AM BUN  23 mg/dL (7 - 25)   1/23/2020 8:39 AM Creatinine Level  0.93 mg/dL (0.70 - 1.33)   1/23/2020 8:39 AM BUN/Creat Ratio  NOT APPLICABLE (6 - 22)   1/23/2020 8:39 AM eGFR  90 mL/min/1.73m2 (> OR = 60 - )   1/23/2020 8:39 AM eGFR African American  105 mL/min/1.73m2 (> OR = 60 - )   1/23/2020 8:39 AM Calcium Level  9.3 mg/dL (8.6 - 10.3)   1/23/2020 8:39 AM Bilirubin Total  0.6 mg/dL (0.2 - 1.2)   1/23/2020 8:39 AM Alkaline Phosphatase  71 unit/L (40 - 115)   1/23/2020 8:39 AM AST/SGOT  16 unit/L (10 - 35)   1/23/2020 8:39 AM ALT/SGPT  16 unit/L (9 - 46)   1/23/2020 8:39 AM Protein Total  7.3 gm/dL (6.1 - 8.1)   1/23/2020 8:39 AM Albumin Level  4.4 gm/dL (3.6 - 5.1)   1/23/2020 8:39 AM Globulin  2.9 (1.9 - 3.7)   1/23/2020 8:39 AM A/G Ratio  1.5 (1.0 - 2.5)   1/23/2020 8:39 AM Cholesterol  174 mg/dL ( - <200)   1/23/2020 8:39 AM Non-HDL Cholesterol ((H)) 130 ( - <130)   1/23/2020 8:39 AM HDL  44 mg/dL (>40 - )   1/23/2020 8:39 AM Cholesterol/HDL Ratio  4.0 ( - <5.0)   1/23/2020 8:39 AM LDL  98    1/23/2020 8:39 AM Triglyceride ((H)) 197 mg/dL ( - <150)   1/23/2020 8:39 AM PSA  0.8 ng/mL ( - < OR = 4.0)

## 2022-02-15 NOTE — TELEPHONE ENCOUNTER
---------------------From: Criss Bone To:  <[email or web address]>;   Sent: 9/26/2019 7:45:10 AM CDTSubject: General Message Patient: YAZMIN GRANADOS; YOB: 1960 The attached Referral Note is for an appointment to be scheduled.  Order is faxed to [facility] and they will contact patient.  Patient informed.

## 2022-02-15 NOTE — PROGRESS NOTES
Patient:   YAZMIN GRANADOS            MRN: [id number]            FIN: [id number]               Age:   61 years     Sex:  Male     :  1960   Associated Diagnoses:   Hypertriglyceridemia; Benign essential HTN   Author:   Castillo Nogueira MD      Impression and Plan   Diagnosis     Hypertriglyceridemia (POW22-ID E78.1).     Course:  Progressing as expected.    Orders     Orders   Charges (Evaluation and Management):  [id number] office o/p est mod 30-39 min (Charge) (Order): Quantity: 1, Benign essential HTN  Hypertriglyceridemia  Prostate cancer screening.     Orders (Selected)   Prescriptions  Prescribed  atorvastatin 20 mg oral tablet: = 1 tab(s) ( 20 mg ), Oral, daily, # 30 tab(s), 11 Refill(s), Type: Maintenance, Pharmacy: [facility], 1 tab(s) Oral daily, 71, in, 21 11:21:00 CST, Height Measured, 294.2, lb, 21 11:21:00 CST, Weight Measured  gemfibrozil 600 mg oral tablet: See Instructions, Instructions: TAKE ONE (1) TABLET BY MOUTH TWO (2) TIMES, # 60 tab(s), 11 Refill(s), Type: Maintenance, Pharmacy: [facility], TAKE ONE (1) TABLET BY MOUTH TWO (2) TIMES, 71, in, 21 11:21:00 CST, Height Measured, 294.2,....     Diagnosis     Benign essential HTN ([id number] I10).     Course:  Well controlled.    Orders     Orders (Selected)   Prescriptions  Prescribed  losartan 50 mg oral tablet: = 1 tab(s) ( 50 mg ), Oral, daily, # 30 tab(s), 11 Refill(s), Type: Maintenance, Pharmacy: [facility], 1 tab(s) Oral daily, 71, in, 21 11:21:00 CST, Height Measured, 294.2, lb, 21 11:21:00 CST, Weight Measured.        Visit Information      Date of Service: 2021 11:20 am  Performing Location: [facility]  Encounter#: [id number]      Primary Care Provider (PCP):  Castillo Nogueira MD    NPI# [id number]   Visit type:  Scheduled follow-up.    Accompanied by:  No one.    Source of history:  Self.    Referral source:  Self.    History limitation:  None.       Chief Complaint    2/16/2021 11:21 AM CST   Pt here for med ck        History of Present Illness             The patient presents for follow-up evaluation of hypertension.  The quality of hypertension symptom(s) since the patient's last visit is described as being unchanged.  The severity of the hypertension symptom(s) since the last visit is moderate.  Since the patient's last visit, the timing/course of hypertension symptom(s) is constant.  Exacerbating factors consist of none.  Relieving factors consist of medication.  Associated symptoms consist of none.  Prior treatment consists of lifestyle modification (weight reduction, dietary sodium restriction, increased physical activity, adoption of DASH eating plan).  Medical encounters: none.  Compliance problems: none.        Interval History   Cholesterol Management   Total cholesterol results < 200 mg/dL (optimal).  LDL cholesterol results < 100 mg/dL (optimal).  Triglyceride results < 150 mg/dL (normal).  The course is progressing as expected.  The effect on daily activities is no change in activity level and no change in eating habits.  Associated symptoms characterized by no fatigue, chest pain, joint pain, muscle weakness or myalgias.        Review of Systems   Constitutional:  Negative.    Eye:  Negative.    Ear/Nose/Mouth/Throat:  Negative.    Respiratory:  Negative.    Cardiovascular:  Negative.    Gastrointestinal:  Negative.    Genitourinary:  Negative.    Hematology/Lymphatics:  Negative.    Endocrine:  Negative.    Immunologic:  Negative.    Musculoskeletal:  Negative, still recovering from left total knee.    Integumentary:  Negative.    Neurologic:  mild sciatica right leg.    Psychiatric:  Negative.    All other systems reviewed and negative      Health Status   Allergies:    Allergic Reactions (Selected)  No Known Medication Allergies   Medications:  (Selected)   Prescriptions  Prescribed  atorvastatin 20 mg oral tablet: = 1 tab(s) ( 20 mg ), Oral, daily, # 30  tab(s), 11 Refill(s), Type: Maintenance, Pharmacy: [facility], 1 tab(s) Oral daily, 71, in, 02/16/21 11:21:00 CST, Height Measured, 294.2, lb, 02/16/21 11:21:00 CST, Weight Measured  gemfibrozil 600 mg oral tablet: See Instructions, Instructions: TAKE ONE (1) TABLET BY MOUTH TWO (2) TIMES, # 60 tab(s), 11 Refill(s), Type: Maintenance, Pharmacy: [facility], TAKE ONE (1) TABLET BY MOUTH TWO (2) TIMES, 71, in, 02/16/21 11:21:00 CST, Height Measured, 294.2,...  losartan 50 mg oral tablet: = 1 tab(s) ( 50 mg ), Oral, daily, # 30 tab(s), 11 Refill(s), Type: Maintenance, Pharmacy: [facility], 1 tab(s) Oral daily, 71, in, 02/16/21 11:21:00 CST, Height Measured, 294.2, lb, 02/16/21 11:21:00 CST, Weight Measured  sildenafil 20 mg oral tablet: = 2 tab(s) ( 40 mg ), Oral, prn, Instructions: as needed for planned sexual activity, PRN: Other (see comment), # 20 tab(s), 5 Refill(s), Type: Maintenance, Pharmacy: [facility], 2 tab(s) Oral prn,PRN:Other (see comment),Instr:as needed for pl...   Problem list:    All Problems (Selected)  Anal Fissure / ICD-9-.0 / Confirmed  Benign essential HTN / SNOMED CT 5845788 / Confirmed  Cervical radiculopathy at C6 / SNOMED CT 011116968 / Confirmed  CAD (coronary artery disease) / SNOMED CT 1630656525 / Confirmed  Hypertriglyceridemia / ICD-9-.1 / Confirmed  Erectile Dysfunction / SNOMED CT 459411341 / Confirmed  Morbid obesity / SNOMED CT 907061688 / Confirmed  Obese / ICD-9-.00 / Probable  Osteoarthritis / SNOMED CT 5732521063 / Confirmed      Histories   Past Medical History:    Resolved  *[email or web address] - Cellulitis LLE: Onset on 2/5/2012 at 52 years.  Resolved.  Repair of diaphragmatic hiatal hernia (492552556):  Resolved in 1992 at 31 years.  Comments:  6/28/2010 CDT 4:26 PM Danae Mccall [employer]  Arthroscopy of knee (922594603):  Resolved in 1990 at 29 years.  Comments:  6/28/2010 CDT 4:28 PM DANIELLE Cruz  Danae  Right knee  Resection of clavicle (57778997):  Resolved in  at 35 years.  Comments:  2010 CDT 4:33 PM CDT - Danae Cruz  Left-distal  Arthroscopy of shoulder (795083127):  Resolved in  at 38 years.  Comments:  2010 CDT 4:34 PM CDT - Danae Cruz  right  Repair of rotator cuff of shoulder (2187899392):  Resolved in  at 44 years.  Comments:  2010 CDT 4:37 PM CDT Jefe Danae Cruz  left  Hip replacement (1915840751):  Resolved in  at 46 years.  Comments:  2010 CDT 4:38 PM CDT - Danae Cruz  right   Family History:    Cancer  Father  Diabetes mellitus  Mother ()  Hypertension  Father  Heart disease  Father  Heart attack  Mother ()     Procedure history:    Arthroplasty of knee - Total left (SNOMED CT 94745972) performed by Dwayne Tinoco DO on 2020 at 60 Years.  Rotator cuff repair (SNOMED CT 247455519) performed by Jacques Paris MD on 10/17/2016 at 56 Years.  Comments:  10/27/2016 8:27 AM Jamaica Wong.    10/6/2016 9:16 AM Castillo Kapoor MD, Dr.  [facility]  10/17/2016   Right C5-7 Cervical foraminotomy in  at 54 Years.  Comments:  1/15/2015 10:29 AM Castillo Garg MD, Dr. [facility]  Right Hip Arthroscopy and Psoas Tenotomy in  at 53 Years.  Colonoscopy (SNOMED CT 079326639) in  at 52 Years.  Arthroscopy of Shoulder in  at 50 Years.  Comments:  2010 3:19 PM Castillo Kapoor MD, Dr.  C6-7 Laminectomy in  at 50 Years.  Comments:  2010 3:20 PM Castillo Kapoor MD, Dr.  [facility]  hip replacement in  at 47 Years.  Comments:  2010 4:56 PM CDT Jefe Sunny Cruzis  Right  Repair of rotator cuff of shoulder (SNOMED CT 3580203625) in  at 45 Years.  Comments:  2010 4:58 PM Danae Mccall  left  Arthroscopy of shoulder (SNOMED CT 217769497) in  at 39 Years.  Comments:  2010 4:55 PM Danae Mccall  Right  Resection  of clavicle (SNOMED CT 65558409) in 1996 at 36 Years.  Comments:  6/28/2010 5:00 PM CDT Danae Real  left distal  Repair of diaphragmatic hiatal hernia (SNOMED CT 613469112) in 1992 at 32 Years.  Comments:  6/28/2010 4:58 PM Danae Mccall  [facility] [employer]  Arthroscopy of knee (SNOMED CT 876234662) in 1990 at 30 Years.  Comments:  6/28/2010 4:54 PM CDT Danae Real  Right   Social History:        Electronic Cigarette/Vaping Assessment            Electronic Cigarette Use: Never.      Alcohol Assessment: Current            Current, Beer (12 oz), 1-2 times per week, 3 drinks/episode average.      Tobacco Assessment: Denies Tobacco Use            Former smoker, quit more than 30 days ago      Substance Abuse Assessment: Denies Substance Abuse      Employment and Education Assessment            Employed                     Comments:                      01/15/2015 - [employer], [employer]      Home and Environment Assessment            Marital status:  (Living together).  Lives with Self, Spouse.  2 children.  Living situation:               Home/Independent.      Exercise and Physical Activity Assessment: Regular exercise            Exercise frequency: Daily.  Exercise type: Walking.        Physical Examination   Vital Signs   2/16/2021 11:21 AM CST Peripheral Pulse Rate 99 bpm    Systolic Blood Pressure 136 mmHg  HI    Diastolic Blood Pressure 70 mmHg    Mean Arterial Pressure 92 mmHg    Oxygen Saturation 97 %      Measurements from flowsheet : Measurements   2/16/2021 11:21 AM CST Height Measured - Standard 71 in    Height/Length Estimated 71 in    Weight Measured - Standard 294.2 lb    BSA 2.58 m2    Body Mass Index 41.03 kg/m2  HI      General:  No acute distress.    Neck:  Supple, No lymphadenopathy, No thyromegaly.    Respiratory:  Lungs are clear to auscultation, Respirations are non-labored, Breath sounds are equal, Symmetrical chest wall expansion.     Cardiovascular:  Normal rate, Regular rhythm, No murmur, No gallop, Good pulses equal in all extremities, Normal peripheral perfusion, No edema.    Gastrointestinal:  Soft, Non-tender, Non-distended, Normal bowel sounds, No organomegaly.    Integumentary:  Warm, Dry, Pink.    Neurologic:  Alert, Oriented.    Psychiatric:  Cooperative, Appropriate mood & affect, Normal judgment.

## 2022-02-15 NOTE — LETTER
(Inserted Image. Unable to display)     January 25, 2021      YAZMIN GRANADOS   [address] [id number]          Dear YAZMIN,      Thank you for selecting [facility] (previously [facility]) for your healthcare needs.    Our records indicate you are due for the following services:     Annual Physical.    (FYI   Regarding office visits: In some instances, a video visit or telephone visit may be offered as an option.)      To schedule an appointment or if you have further questions, please contact your clinic at [phone].      Powered by [employer]    Sincerely,    Castillo Nogueira MD

## 2022-02-15 NOTE — CARE COORDINATION
Pt appears on _[facility] chronic disease panel as out of parameters for __IVD/Aspirin.  pt has RTC 5/2018 medication check with fasting labs.  Called to pt LMOMTCB about taking Aspirin.

## 2022-02-15 NOTE — NURSING NOTE
CAGE Assessment Entered On:  8/17/2020 4:15 PM CDT    Performed On:  8/17/2020 4:15 PM CDT by Sarah Cervantes CMA               Assessment   Have you ever felt you should cut down on your drinking :   No   Have people annoyed you by criticizing your drinking :   No   Have you ever felt bad or guilty about your drinking :   No   Have you ever taken a drink first thing in the morning to steady your nerves or get rid of a hangover (Eye-opener) :   No   CAGE Score :   0    Sarah Cervantes CMA - 8/17/2020 4:15 PM CDT

## 2022-02-15 NOTE — NURSING NOTE
CAGE Assessment Entered On:  2/5/2019 8:25 AM CST    Performed On:  2/5/2019 8:25 AM CST by Sarah Cervantes CMA               Assessment   Have you ever felt you should cut down on your drinking :   No   Have people annoyed you by criticizing your drinking :   No   Have you ever felt bad or guilty about your drinking :   No   Have you ever taken a drink first thing in the morning to steady your nerves or get rid of a hangover (Eye-opener) :   No   CAGE Score :   0    Sarah Cervantes CMA - 2/5/2019 8:25 AM CST

## 2022-02-15 NOTE — NURSING NOTE
Comprehensive Intake Entered On:  1/23/2020 8:09 AM CST    Performed On:  1/23/2020 8:07 AM CST by Sarah Cervantes CMA               Summary   Chief Complaint :   Pt here for px   Weight Measured :   309 lb(Converted to: 309 lb 0 oz, 140.16 kg)    Height Measured :   71 in(Converted to: 5 ft 11 in, 180.34 cm)    Body Mass Index :   43.09 kg/m2 (HI)    Body Surface Area :   2.65 m2   Systolic Blood Pressure :   128 mmHg   Diastolic Blood Pressure :   72 mmHg   Mean Arterial Pressure :   91 mmHg   Peripheral Pulse Rate :   78 bpm   Oxygen Saturation :   97 %   Race :      Languages :   English   Ethnicity :   Not  or    Sarah Cervantes CMA - 1/23/2020 8:07 AM CST   Health Status   Allergies Verified? :   Yes   Medication History Verified? :   Yes   Immunizations Current :   Yes   Medical History Verified? :   Yes   Pre-Visit Planning Status :   Not completed   Tobacco Use? :   Never smoker   Sarah Cervantes CMA - 1/23/2020 8:07 AM CST   Consents   Consent for Immunization Exchange :   Consent Granted   Consent for Immunizations to Providers :   Consent Granted   Sarah Cervantes CMA - 1/23/2020 8:07 AM CST   Meds / Allergies   (As Of: 1/23/2020 8:31:35 AM CST)   Allergies (Active)   No known allergies  Estimated Onset Date:   Unspecified ; Created By:   Myriam Graves; Reaction Status:   Active ; Category:   Drug ; Substance:   No known allergies ; Type:   Allergy ; Updated By:   Myriam Graves; Reviewed Date:   1/23/2020 8:31 AM CST        Medication List   (As Of: 1/23/2020 8:31:35 AM CST)   Prescription/Discharge Order    atorvastatin  :   atorvastatin ; Status:   Prescribed ; Ordered As Mnemonic:   atorvastatin 20 mg oral tablet ; Simple Display Line:   20 mg, 1 tab(s), po, daily, 90 tab(s), 1 Refill(s) ; Ordering Provider:   Castillo Nogueira MD; Catalog Code:   atorvastatin ; Order Dt/Tm:   9/16/2019 9:21:04 AM CDT          gemfibrozil  :   gemfibrozil ; Status:   Prescribed ; Ordered As  Mnemonic:   gemfibrozil 600 mg oral tablet ; Simple Display Line:   600 mg, 1 tab(s), Oral, bid, 180 tab(s), 1 Refill(s) ; Ordering Provider:   Castillo Nogueira MD; Catalog Code:   gemfibrozil ; Order Dt/Tm:   9/17/2019 12:15:18 PM CDT          losartan  :   losartan ; Status:   Prescribed ; Ordered As Mnemonic:   losartan 50 mg oral tablet ; Simple Display Line:   1 tab(s), Oral, daily, 90 tab(s), 1 Refill(s) ; Ordering Provider:   Castillo Nogueira MD; Catalog Code:   losartan ; Order Dt/Tm:   9/16/2019 1:47:29 PM CDT          naproxen  :   naproxen ; Status:   Prescribed ; Ordered As Mnemonic:   naproxen sodium 220 mg oral tablet ; Simple Display Line:   440 mg, 2 tab(s), PO, q8hr, PRN: for pain, 60 tab(s), 0 Refill(s) ; Ordering Provider:   Castillo Nogueira MD; Catalog Code:   naproxen ; Order Dt/Tm:   5/10/2017 3:45:25 PM CDT          sildenafil  :   sildenafil ; Status:   Prescribed ; Ordered As Mnemonic:   sildenafil 20 mg oral tablet ; Simple Display Line:   40 mg, 2 tab(s), Oral, prn, as needed for planned sexual activity, PRN: Other (see comment), 20 tab(s), 5 Refill(s) ; Ordering Provider:   Castillo Nogueira MD; Catalog Code:   sildenafil ; Order Dt/Tm:   9/24/2019 2:56:03 PM CDT

## 2022-02-15 NOTE — NURSING NOTE
Comprehensive Intake Entered On:  2/17/2020 9:22 AM CST    Performed On:  2/17/2020 9:21 AM CST by Sarah Cervantes CMA               Summary   Chief Complaint :   Pt here for tx of spots on hands   Weight Measured :   309 lb(Converted to: 309 lb 0 oz, 140.16 kg)    Height Measured :   71 in(Converted to: 5 ft 11 in, 180.34 cm)    Body Mass Index :   43.09 kg/m2 (HI)    Body Surface Area :   2.65 m2   Race :      Languages :   English   Ethnicity :   Not  or    Sarah Cervantes CMA - 2/17/2020 9:21 AM CST   Health Status   Allergies Verified? :   Yes   Medication History Verified? :   Yes   Immunizations Current :   Yes   Medical History Verified? :   Yes   Pre-Visit Planning Status :   Completed   Tobacco Use? :   Never smoker   Sarah Cervantes CMA - 2/17/2020 9:21 AM CST   Consents   Consent for Immunization Exchange :   Consent Granted   Consent for Immunizations to Providers :   Consent Granted   Sarah Cervantes CMA 2/17/2020 9:21 AM CST   Meds / Allergies   (As Of: 2/17/2020 9:22:16 AM CST)   Allergies (Active)   No known allergies  Estimated Onset Date:   Unspecified ; Created By:   Myriam Graves CMA; Reaction Status:   Active ; Category:   Drug ; Substance:   No known allergies ; Type:   Allergy ; Updated By:   Myriam Graves CMA; Reviewed Date:   2/17/2020 9:22 AM CST        Medication List   (As Of: 2/17/2020 9:22:16 AM CST)   Prescription/Discharge Order    atorvastatin  :   atorvastatin ; Status:   Prescribed ; Ordered As Mnemonic:   atorvastatin 20 mg oral tablet ; Simple Display Line:   20 mg, 1 tab(s), po, daily, 90 tab(s), 1 Refill(s) ; Ordering Provider:   Castillo Nogueira MD; Catalog Code:   atorvastatin ; Order Dt/Tm:   9/16/2019 9:21:04 AM CDT          gemfibrozil  :   gemfibrozil ; Status:   Prescribed ; Ordered As Mnemonic:   gemfibrozil 600 mg oral tablet ; Simple Display Line:   600 mg, 1 tab(s), Oral, bid, 180 tab(s), 1 Refill(s) ; Ordering Provider:   Castillo Nogueira MD;  Catalog Code:   gemfibrozil ; Order Dt/Tm:   9/17/2019 12:15:18 PM CDT          losartan  :   losartan ; Status:   Prescribed ; Ordered As Mnemonic:   losartan 50 mg oral tablet ; Simple Display Line:   1 tab(s), Oral, daily, 90 tab(s), 1 Refill(s) ; Ordering Provider:   Castillo Nogueira MD; Catalog Code:   losartan ; Order Dt/Tm:   9/16/2019 1:47:29 PM CDT          naproxen  :   naproxen ; Status:   Prescribed ; Ordered As Mnemonic:   naproxen sodium 220 mg oral tablet ; Simple Display Line:   440 mg, 2 tab(s), PO, q8hr, PRN: for pain, 60 tab(s), 0 Refill(s) ; Ordering Provider:   Castillo Nogueira MD; Catalog Code:   naproxen ; Order Dt/Tm:   5/10/2017 3:45:25 PM CDT          sildenafil  :   sildenafil ; Status:   Prescribed ; Ordered As Mnemonic:   sildenafil 20 mg oral tablet ; Simple Display Line:   40 mg, 2 tab(s), Oral, prn, as needed for planned sexual activity, PRN: Other (see comment), 20 tab(s), 5 Refill(s) ; Ordering Provider:   Castillo Nogueira MD; Catalog Code:   sildenafil ; Order Dt/Tm:   9/24/2019 2:56:03 PM CDT

## 2022-02-15 NOTE — PROGRESS NOTES
Patient:   YAZMIN GRANADOS            MRN: [id number]            FIN: [id number]               Age:   58 years     Sex:  Male     :  1960   Associated Diagnoses:   Paronychia, finger   Author:   Nelda Zhang      Chief Complaint   10/26/2018 1:34 PM CDT   Possbile infection on right ring finger. No injury. Swollen. Present since 10/22/18.        History of Present Illness   confirmed chief complaint with patient  gets these periodically, usually he soaks in soapy water and self resolved but he is going to [place] tomorrow and would like medication to treat  no fever  painful         Review of Systems             Health Status   Allergies:    Allergic Reactions (Selected)  No known allergies   Medications:  (Selected)   Prescriptions  Prescribed  atorvastatin 20 mg oral tablet: 1 tab(s) ( 20 mg ), po, daily, # 90 tab(s), 3 Refill(s), Type: Maintenance, Pharmacy: [facility], 1 tab(s) po daily  cephalexin 500 mg oral tablet: = 1 tab(s) ( 500 mg ), PO, tid, # 15 tab(s), 0 Refill(s), Type: Maintenance, Pharmacy: [facility], 1 tab(s) Oral tid,x5 day(s)  losartan 50 mg oral tablet: = 1 tab(s) ( 50 mg ), PO, Daily, # 90 tab(s), 1 Refill(s), Type: Maintenance, Pharmacy: [facility], 1 tab(s) Oral daily  naproxen sodium 220 mg oral tablet: 2 tab(s) ( 440 mg ), PO, q8hr, PRN: for pain, # 60 tab(s), 0 Refill(s), Type: Maintenance, OTC (Rx)  Documented Medications  Documented  aspirin 81 mg oral delayed release tablet: 1 tab(s) ( 81 mg ), po, daily, 0 Refill(s), Type: Maintenance   Problem list:    All Problems  Osteoarthritis / ICD-9-.90 / Confirmed  Obese / ICD-9-.00 / Probable  Erectile Dysfunction / SNOMED CT 360034195 / Confirmed  Anal Fissure / ICD-9-.0 / Confirmed  Hypertriglyceridemia / ICD-9-.1 / Confirmed  CAD (coronary artery disease) / SNOMED CT 6154887161 / Confirmed  Cervical radiculopathy at C6 / SNOMED CT 972448147 / Confirmed  Morbid obesity / SNOMED CT  071814285 / Confirmed  Benign essential HTN / SNOMED CT 2782952 / Confirmed  Inactive: GERD (Gastroesophageal Reflux Disease) / ICD-9-.81  Resolved: Repair of diaphragmatic hiatal hernia / SNOMED CT 670681642  Francis Fundaplication  Resolved: Arthroscopy of knee / SNOMED CT 291385791  Right knee  Resolved: Resection of clavicle / SNOMED CT 08644562  Left-distal  Resolved: Arthroscopy of shoulder / SNOMED CT 852164538  right  Resolved: Repair of rotator cuff of shoulder / SNOMED CT 6783493531  left  Resolved: Hip replacement / SNOMED CT 0013158143  right  Resolved: *Hospitalized@[facility] - Cellulitis LLE  Canceled: Hyperlipemia / ICD-9-.4      Histories   Past Medical History:    Resolved  *Hospitalized@[facility] - Cellulitis LLE: Onset on 2012 at 52 years.  Resolved.  Repair of diaphragmatic hiatal hernia (432476805):  Resolved in  at 31 years.  Comments:  2010 CDT 4:26 PM CDT Danae Real  Francis Fundaplication  Arthroscopy of knee (906012001):  Resolved in  at 29 years.  Comments:  2010 CDT 4:28 PM Danae Mccall  Right knee  Resection of clavicle (38490489):  Resolved in  at 35 years.  Comments:  2010 CDT 4:33 PM Danae Mccall  Left-distal  Arthroscopy of shoulder (006871975):  Resolved in  at 38 years.  Comments:  2010 CDT 4:34 PM Danae Mccall  right  Repair of rotator cuff of shoulder (5458624108):  Resolved in  at 44 years.  Comments:  2010 CDT 4:37 PM Danae Mccall  left  Hip replacement (8085451094):  Resolved in  at 46 years.  Comments:  2010 CDT 4:38 PM Danae Mccall  right   Family History:    Cancer  Father  Diabetes mellitus  Mother ()  Hypertension  Father  Heart disease  Father  Heart attack  Mother ()     Procedure history:    Rotator cuff repair (SNOMED CT 400806654) performed by Jacques Paris MD on 10/17/2016 at 56 Years.  Comments:  10/27/2016 8:27 AM - [place]  Left.    10/6/2016  9:16 Castillo Carpenter MD, Dr.  [facility]  10/17/2016   Right C5-7 Cervical foraminotomy in 2014 at 54 Years.  Comments:  1/15/2015 10:29 Castillo Carpenter MD, Dr. [facility]  Right Hip Arthroscopy and Psoas Tenotomy in 2013 at 53 Years.  Colonoscopy (SNOMED CT 002771929) in 2012 at 52 Years.  Arthroscopy of Shoulder in 2010 at 50 Years.  Comments:  6/29/2010 3:19 PM - Castillo Nogueira MD, Dr.  C6-7 Laminectomy in 2010 at 50 Years.  Comments:  6/29/2010 3:20 PM - Castillo Nogueira MD, Dr.  [facility]  hip replacement in 2007 at 47 Years.  Comments:  6/28/2010 4:56 PM - Danae Cruz  Repair of rotator cuff of shoulder (SNOMED CT 2209110836) in 2005 at 45 Years.  Comments:  6/28/2010 4:58 PM - Danae Cruz  left  Arthroscopy of shoulder (SNOMED CT 957634512) in 1999 at 39 Years.  Comments:  6/28/2010 4:55 PM - Danae Cruz  Right  Resection of clavicle (SNOMED CT 20117524) in 1996 at 36 Years.  Comments:  6/28/2010 5:00 PM - Danae Cruz distal  Repair of diaphragmatic hiatal hernia (SNOMED CT 725668064) in 1992 at 32 Years.  Comments:  6/28/2010 4:58 PM Danae Real  Francis Fundaplication  Arthroscopy of knee (SNOMED CT 427858586) in 1990 at 30 Years.  Comments:  6/28/2010 4:54 PM - Danae Cruz   Social History:        Alcohol Assessment: Current            Current, Beer (12 oz), 1-2 times per week, 3 drinks/episode average.      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  Lives with Self, Spouse.  2 children.  Living situation:               Home/Independent.      Exercise and Physical Activity Assessment: Regular exercise             Exercise frequency: Daily.  Exercise type: Walking.        Physical Examination   Vital Signs   10/26/2018 1:34 PM CDT Temperature Tympanic 98.7 DegF    Peripheral Pulse Rate 70 bpm    HR Method Electronic    Systolic Blood Pressure 126 mmHg    Diastolic Blood Pressure 64 mmHg    Mean Arterial Pressure 85 mmHg    BP Site Right arm    BP Method Manual    Oxygen Saturation 97 %      Measurements from flowsheet : Measurements   10/26/2018 1:34 PM CDT Height/Length Estimated 71 in    Weight Measured - Standard 305 lb      General:  Alert and oriented, erythematous along the baase and latteral aspect of the finger nail, area does not look floculant enough to beverley.       Impression and Plan   Diagnosis     Paronychia, finger (HQG92-HU L03.019).     Patient Instructions:       Counseled: Patient, Regarding diagnosis, Regarding treatment, Regarding medications, Verbalized understanding, Counseled on symptomatic management. Return to clinic for re evaluation if worsening, simply not improving, or failure to resolve.   , must soak on epsom salts warm water qid.    Orders     Orders (Selected)   Prescriptions  Prescribed  cephalexin 500 mg oral tablet: = 1 tab(s) ( 500 mg ), PO, tid, # 15 tab(s), 0 Refill(s), Type: Maintenance, Pharmacy: [facility], 1 tab(s) Oral tid,x5 day(s).

## 2022-02-15 NOTE — TELEPHONE ENCOUNTER
Entered by Sarah Cervantes CMA on March 02, 2020 1:07:20 PM CST  ---------------------  From: Sarah Cervantes CMA   To: [facility]    Sent: 3/2/2020 1:07:19 PM CST  Subject: Medication Management     ** Submitted: **  Order:losartan (losartan 50 mg oral tablet)  1 tab(s)  Oral  daily  Qty:  90 tab(s)        Refills:  1          Substitutions Allowed     Route To [facility]    Signed by Sarah Cervantes CMA  3/2/2020 1:07:00 PM    ** Submitted: **  Complete:losartan (losartan 50 mg oral tablet)   Signed by Sarah Cervantes CMA  3/2/2020 1:07:00 PM    ** Not Approved:  **  losartan (LOSARTAN POT 50MG)  TAKE ONE (1) TABLET DAILY  Qty:  90 tab(s)        Days Supply:  30        Refills:  0          Substitutions Allowed     Route To [facility]   Signed by Sarah Cervantes CMA            ** Submitted: **  Order:atorvastatin (atorvastatin 20 mg oral tablet)  1 tab(s)  Oral  daily  Qty:  90 tab(s)        Refills:  1          Substitutions Allowed     Route To [facility]    Signed by Sarah Cervantes CMA  3/2/2020 1:06:00 PM    ** Submitted: **  Complete:atorvastatin (atorvastatin 20 mg oral tablet)   Signed by Sarah Cervantes CMA  3/2/2020 1:07:00 PM    ** Not Approved:  **  atorvastatin (ATORVASTATIN 20MG)  TAKE ONE (1) TABLET EACH NIGHT AT BEDTIME FOR CHOLESTEROL  Qty:  90 tab(s)        Days Supply:  30        Refills:  0          Substitutions Allowed     Route To [facility]   Signed by Sarah Cervantes CMA            ** Patient matched by Sarah Cervantes CMA on 3/2/2020 1:06:17 PM CST **      ------------------------------------------  From: [facility]  To: Castillo Nogueira MD  Sent: March 2, 2020 1:01:28 PM CST  Subject: Medication Management  Due: March 3, 2020 1:01:28 PM CST    ** On Hold Pending Signature **  Drug: losartan (losartan 50 mg oral tablet)  TAKE ONE (1) TABLET DAILY  Quantity: 90 tab(s)  Days Supply:  30  Refills: 0  Substitutions Allowed  Notes from Pharmacy:     Dispensed Drug: losartan (losartan 50 mg oral tablet)  TAKE ONE (1) TABLET DAILY  Quantity: 90 tab(s)  Days Supply: 30  Refills: 0  Substitutions Allowed  Notes from Pharmacy:     ** On Hold Pending Signature **  Drug: atorvastatin (atorvastatin 20 mg oral tablet)  TAKE ONE (1) TABLET EACH NIGHT AT BEDTIME FOR CHOLESTEROL  Quantity: 90 tab(s)  Days Supply: 30  Refills: 0  Substitutions Allowed  Notes from Pharmacy:     Dispensed Drug: atorvastatin (atorvastatin 20 mg oral tablet)  TAKE ONE (1) TABLET EACH NIGHT AT BEDTIME FOR CHOLESTEROL  Quantity: 90 tab(s)  Days Supply: 30  Refills: 0  Substitutions Allowed  Notes from Pharmacy:   ------------------------------------------

## 2022-02-15 NOTE — TELEPHONE ENCOUNTER
---------------------  From: Castillo Nogueira MD   To: YAZMIN GRANADOS    Sent: 2/17/2021 10:59:12 AM CST  Subject: General Message     PSA is Normal.      Results:  Date Result Name Value Ref Range   2/16/2021 11:59 AM PSA 0.6 ng/mL ( - < OR = 4.0)

## 2022-02-15 NOTE — NURSING NOTE
Comprehensive Intake Entered On:  2/5/2019 8:25 AM CST    Performed On:  2/5/2019 8:21 AM CST by Sarah Cervantes CMA               Summary   Chief Complaint :   Pt here for med ck   Weight Measured :   309.4 lb(Converted to: 309 lb 6 oz, 140.34 kg)    Height Measured :   71 in(Converted to: 5 ft 11 in, 180.34 cm)    Body Mass Index :   43.15 kg/m2 (HI)    Body Surface Area :   2.65 m2   Systolic Blood Pressure :   136 mmHg (HI)    Diastolic Blood Pressure :   88 mmHg (HI)    Mean Arterial Pressure :   104 mmHg   Peripheral Pulse Rate :   66 bpm   BP Site :   Right arm   Oxygen Saturation :   97 %   Race :      Languages :   English   Ethnicity :   Not  or    Sarah Cervantes CMA - 2/5/2019 8:21 AM CST   Health Status   Allergies Verified? :   Yes   Medication History Verified? :   Yes   Immunizations Current :   Yes   Medical History Verified? :   Yes   Pre-Visit Planning Status :   Completed   Tobacco Use? :   Never smoker   Sarah Cervantes CMA - 2/5/2019 8:21 AM CST   Consents   Consent for Immunization Exchange :   Consent Granted   Consent for Immunizations to Providers :   Consent Granted   Sarah Cervantes CMA - 2/5/2019 8:21 AM CST   Meds / Allergies   (As Of: 2/5/2019 8:25:46 AM CST)   Allergies (Active)   No known allergies  Estimated Onset Date:   Unspecified ; Created By:   Myriam Graves; Reaction Status:   Active ; Category:   Drug ; Substance:   No known allergies ; Type:   Allergy ; Updated By:   Myriam Graves; Reviewed Date:   2/5/2019 8:24 AM CST        Medication List   (As Of: 2/5/2019 8:25:46 AM CST)   Prescription/Discharge Order    atorvastatin  :   atorvastatin ; Status:   Prescribed ; Ordered As Mnemonic:   atorvastatin 20 mg oral tablet ; Simple Display Line:   20 mg, 1 tab(s), po, daily, 90 tab(s), 3 Refill(s) ; Ordering Provider:   Castillo Nogueira MD; Catalog Code:   atorvastatin ; Order Dt/Tm:   3/20/2018 8:51:06 AM          cephalexin  :   cephalexin ; Status:    Processing ; Ordered As Mnemonic:   cephalexin 500 mg oral tablet ; Ordering Provider:   Nelda Zhang; Action Display:   Complete ; Catalog Code:   cephalexin ; Order Dt/Tm:   2/5/2019 8:22:05 AM          losartan  :   losartan ; Status:   Prescribed ; Ordered As Mnemonic:   losartan 50 mg oral tablet ; Simple Display Line:   50 mg, 1 tab(s), PO, Daily, 90 tab(s), 1 Refill(s) ; Ordering Provider:   Castillo Nogueira MD; Catalog Code:   losartan ; Order Dt/Tm:   9/19/2018 11:46:43 AM          naproxen  :   naproxen ; Status:   Prescribed ; Ordered As Mnemonic:   naproxen sodium 220 mg oral tablet ; Simple Display Line:   440 mg, 2 tab(s), PO, q8hr, PRN: for pain, 60 tab(s), 0 Refill(s) ; Ordering Provider:   Castillo Nogueira MD; Catalog Code:   naproxen ; Order Dt/Tm:   5/10/2017 3:45:25 PM            Home Meds    aspirin  :   aspirin ; Status:   Processing ; Ordered As Mnemonic:   aspirin 81 mg oral delayed release tablet ; Action Display:   Complete ; Catalog Code:   aspirin ; Order Dt/Tm:   2/5/2019 8:24:15 AM            Depression Screening   Feeling Down, Depressed, Hopeless :   Not at all   Little Interest - Pleasure in Activities :   Not at all   Initial Depression Screen Score :   0    Trouble Falling or Staying Asleep :   Not at all   Feeling Tired or Little Energy :   Not at all   Poor Appetite or Overeating :   Not at all   Feeling Bad About Yourself :   Not at all   Trouble Concentrating :   Not at all   Moving or Speaking Slowly :   Not at all   Thoughts Better Off Dead or Hurting Self :   Not at all   Detailed Depression Screen Score :   0    Total Depression Screen Score :   0    CITLALLI Difficulty with Work, Home, Others :   Not difficult at all   Sarah Cervantes CMA - 2/5/2019 8:21 AM CST

## 2022-02-15 NOTE — PROGRESS NOTES
Patient:   YAZMIN GRANADOS            MRN: [id number]            FIN: [id number]               Age:   59 years     Sex:  Male     :  1960   Associated Diagnoses:   Hypertriglyceridemia; Benign essential HTN; CAD (coronary artery disease)   Author:   Castillo Nogueira MD      Impression and Plan   Diagnosis     Hypertriglyceridemia (LKY80-DE E78.1).     Course:  Progressing as expected.    Orders     Orders   Charges (Evaluation and Management):  60082 office outpatient visit 25 minutes (Charge) (Order): Quantity: 1, Benign essential HTN  Prostate cancer screening  Osteoarthritis  Hypertriglyceridemia.     Orders (Selected)   Prescriptions  Prescribed  atorvastatin 20 mg oral tablet: = 1 tab(s) ( 20 mg ), po, daily, # 90 tab(s), 1 Refill(s), Type: Maintenance, Pharmacy: [facility], 1 tab(s) Oral daily.     Diagnosis     Benign essential HTN (CTU15-YP I10).     Course:  Well controlled.    Orders     Orders (Selected)   Prescriptions  Prescribed  losartan 50 mg oral tablet: = 1 tab(s) ( 50 mg ), PO, Daily, # 90 tab(s), 1 Refill(s), Type: Maintenance, Pharmacy: [facility], 1 tab(s) Oral daily.     Diagnosis     CAD (coronary artery disease) (PQI53-CB I25.10).     Course:  Progressing as expected.    Orders     Orders (Selected)   Documented Medications  Completed  aspirin 81 mg oral delayed release tablet: 1 tab(s) ( 81 mg ), po, daily, 0 Refill(s), Type: Maintenance.        Visit Information      Date of Service: 2019 08:20 am  Performing Location: [facility]  Encounter#: [id number]      Primary Care Provider (PCP):  Castillo Nogueira MD    NPI# [id number]   Visit type:  Scheduled follow-up.    Accompanied by:  No one.    Source of history:  Self.    Referral source:  Self.    History limitation:  None.       Chief Complaint   2019 8:21 AM CST     Pt here for med ck        History of Present Illness             The patient presents for follow-up evaluation of hypertension.  The  quality of hypertension symptom(s) since the patient's last visit is described as being unchanged.  The severity of the hypertension symptom(s) since the last visit is moderate.  Since the patient's last visit, the timing/course of hypertension symptom(s) is constant.  Exacerbating factors consist of none.  Relieving factors consist of medication.  Associated symptoms consist of none.  Prior treatment consists of lifestyle modification (weight reduction, dietary sodium restriction, increased physical activity, adoption of DASH eating plan).  Medical encounters: none.  Compliance problems: none.        Interval History   Cholesterol Management   Total cholesterol results < 200 mg/dL (optimal).  LDL cholesterol results < 100 mg/dL (optimal).  Triglyceride results < 150 mg/dL (normal).  The course is progressing as expected.  The effect on daily activities is no change in activity level and no change in eating habits.  Associated symptoms characterized by no fatigue, chest pain, joint pain, muscle weakness or myalgias.     Ischemic Heart Disease   Anginal episodes none.  The course is progressing as expected.  The effect on daily activities is no change in activity level, no change in household functions and no change in work/school duties.  Associated symptoms characterized by no fatigue, weight gain, chest pain, edema: peripheral or shortness of breath.        Review of Systems   Constitutional:  Negative.    Eye:  Negative.    Ear/Nose/Mouth/Throat:  Negative.    Respiratory:  Negative.    Cardiovascular:  Negative.    Gastrointestinal:  Negative.    Genitourinary:  Negative.    Hematology/Lymphatics:  Negative.    Endocrine:  Negative.    Immunologic:  Negative.    Musculoskeletal:  Negative.    Integumentary:  Negative.    Neurologic:  Negative.    Psychiatric:  Negative.    All other systems reviewed and negative      Health Status   Allergies:    Allergic Reactions (Selected)  No known allergies   Medications:   (Selected)   Prescriptions  Prescribed  atorvastatin 20 mg oral tablet: = 1 tab(s) ( 20 mg ), po, daily, # 90 tab(s), 1 Refill(s), Type: Maintenance, Pharmacy: [facility], 1 tab(s) Oral daily  losartan 50 mg oral tablet: = 1 tab(s) ( 50 mg ), PO, Daily, # 90 tab(s), 1 Refill(s), Type: Maintenance, Pharmacy: [facility], 1 tab(s) Oral daily  naproxen sodium 220 mg oral tablet: 2 tab(s) ( 440 mg ), PO, q8hr, PRN: for pain, # 60 tab(s), 0 Refill(s), Type: Maintenance, OTC (Rx)   Problem list:    All Problems (Selected)  Anal Fissure / ICD-9-.0 / Confirmed  Benign essential HTN / SNOMED CT 2151241 / Confirmed  CAD (coronary artery disease) / SNOMED CT 8439717891 / Confirmed  Cervical radiculopathy at C6 / SNOMED CT 463341074 / Confirmed  Erectile Dysfunction / SNOMED CT 189634509 / Confirmed  Hypertriglyceridemia / ICD-9-.1 / Confirmed  Morbid obesity / SNOMED CT 294292838 / Confirmed  Obese / ICD-9-.00 / Probable  Osteoarthritis / ICD-9-.90 / Confirmed      Histories   Past Medical History:    Resolved  *Hospitalized@[facility] - Cellulitis LLE: Onset on 2/5/2012 at 52 years.  Resolved.  Repair of diaphragmatic hiatal hernia (234086426):  Resolved in 1992 at 31 years.  Comments:  6/28/2010 CDT 4:26 PM Danae Mccall  Francis Fundaplication  Arthroscopy of knee (378462760):  Resolved in 1990 at 29 years.  Comments:  6/28/2010 CDT 4:28 PM Danae Mccall  Right knee  Resection of clavicle (17830587):  Resolved in 1996 at 35 years.  Comments:  6/28/2010 CDT 4:33 PM Danae Mccall  Left-distal  Arthroscopy of shoulder (127752783):  Resolved in 1999 at 38 years.  Comments:  6/28/2010 CDT 4:34 PM Danae Mccall  right  Repair of rotator cuff of shoulder (5456063976):  Resolved in 2005 at 44 years.  Comments:  6/28/2010 CDT 4:37 PM Danae Mccall  left  Hip replacement (8828383507):  Resolved in 2007 at 46 years.  Comments:  6/28/2010 CDT 4:38 PM Danae Mccall  right    Family History:    Cancer  Father  Diabetes mellitus  Mother ()  Hypertension  Father  Heart disease  Father  Heart attack  Mother ()     Procedure history:    Rotator cuff repair (SNOMED CT 752763283) performed by Jacques Paris MD on 10/17/2016 at 56 Years.  Comments:  10/27/2016 8:27 AM DANIELLE Holman [place] Garcia.    10/6/2016 9:16 AM Castillo Kapoor MD, Dr.  [facility]  10/17/2016   Right C5-7 Cervical foraminotomy in  at 54 Years.  Comments:  1/15/2015 10:29 AM Castillo Garg MD, Dr. [facility]  Right Hip Arthroscopy and Psoas Tenotomy in  at 53 Years.  Colonoscopy (SNOMED CT 700643171) in  at 52 Years.  Arthroscopy of Shoulder in  at 50 Years.  Comments:  2010 3:19 PM Castillo Kapoor MD, Dr.  C6-7 Laminectomy in  at 50 Years.  Comments:  2010 3:20 PM Castillo Kapoor MD, Dr. [facility]  hip replacement in  at 47 Years.  Comments:  2010 4:56 PM Danae Mccall  Right  Repair of rotator cuff of shoulder (SNOMED CT 5454591962) in  at 45 Years.  Comments:  2010 4:58 PM Danae Mccall  left  Arthroscopy of shoulder (SNOMED CT 172885284) in  at 39 Years.  Comments:  2010 4:55 PM Danae Mccall  Right  Resection of clavicle (SNOMED CT 10054785) in  at 36 Years.  Comments:  2010 5:00 PM Danae Mccall  left distal  Repair of diaphragmatic hiatal hernia (SNOMED CT 738674512) in  at 32 Years.  Comments:  2010 4:58 PM Danae Mccall  Francis Fundaplication  Arthroscopy of knee (SNOMED CT 730449383) in  at 30 Years.  Comments:  2010 4:54 PM Danae Mccall   Social History:        Alcohol Assessment: Current            Current, Beer (12 oz), 1-2 times per week, 3 drinks/episode average.      Tobacco Assessment: Denies Tobacco Use      Substance Abuse Assessment: Denies Substance Abuse      Employment and Education  Assessment            Employed                     Comments:                      01/15/2015 - [employer], [employer]      Home and Environment Assessment            Marital status:  (Living together).  Lives with Self, Spouse.  2 children.  Living situation:               Home/Independent.      Exercise and Physical Activity Assessment: Regular exercise            Exercise frequency: Daily.  Exercise type: Walking.      Physical Examination   Vital Signs   2/5/2019 8:21 AM CST Peripheral Pulse Rate 66 bpm    Systolic Blood Pressure 136 mmHg  HI    Diastolic Blood Pressure 88 mmHg  HI    Mean Arterial Pressure 104 mmHg    BP Site Right arm    Oxygen Saturation 97 %      Measurements from flowsheet : Measurements   2/5/2019 8:21 AM CST Height Measured - Standard 71 in    Weight Measured - Standard 309.4 lb    BSA 2.65 m2    Body Mass Index 43.15 kg/m2  HI      General:  No acute distress.    Neck:  Supple, No lymphadenopathy, No thyromegaly.    Respiratory:  Lungs are clear to auscultation, Respirations are non-labored, Breath sounds are equal, Symmetrical chest wall expansion.    Cardiovascular:  Normal rate, Regular rhythm, No murmur, No gallop, Good pulses equal in all extremities, Normal peripheral perfusion, No edema.    Gastrointestinal:  Soft, Non-tender, Non-distended, Normal bowel sounds, No organomegaly.    Integumentary:  Warm, Dry, Pink.    Neurologic:  Alert, Oriented.    Psychiatric:  Cooperative, Appropriate mood & affect.

## 2022-02-15 NOTE — PROGRESS NOTES
Patient:   YAZMIN GRANADOS            MRN: [id number]            FIN: [id number]               Age:   56 years     Sex:  Male     :  1960   Associated Diagnoses:   Well adult exam   Author:   Castillo Nogueira MD      Impression and Plan   Diagnosis     Well adult exam (NAJ40-XA Z00.00).     Course:  Well controlled.    Plan:  Eye exam yearly: UTD  Dental exam yearly: UTD  Hearing exam yearly: UTD  Depression Screening yearly: UTD  Prostate screen male every year: UTD  Colorectal yearly (every ten years colonoscopy): UTD  Adacel every ten years: UTD  Lipid screen every five years: UTD  Fasting Glucose every five years: UTD.    Orders     Orders   Charges (Evaluation and Management):  12438 periodic preventive med est patient 40-64yrs (Charge) (Order): Quantity: 1, Well adult exam.     Orders (Selected)   Outpatient Orders  Ordered  CBC, Platelet; no differential (Request): Hypertriglyceridemia  Prostate cancer screening  Comprehensive Metabolic Panel (Request): Hypertriglyceridemia  Prostate cancer screening  Lipid Panel (Request): Hypertriglyceridemia  Prostate cancer screening  PSA (Request): Hypertriglyceridemia  Prostate cancer screening  Prescriptions  Prescribed  atorvastatin 20 mg oral tablet: 1 tab(s) ( 20 mg ), PO, Daily, # 90 tab(s), 3 Refill(s), Type: Maintenance, Pharmacy: [facility], 1 tab(s) po daily.        Visit Information      Date of Service: 01/10/2017 08:55 am  Performing Location: [facility]  Encounter#: [id number]      Primary Care Provider (PCP):  Castillo Nogueira MD    NPI# [id number]      Referring Provider:  No referring provider recorded for selected visit.   Visit type:  Annual exam.    Accompanied by:  No one.    Source of history:  Self.    History limitation:  None.       Chief Complaint   Chief complaint discussed and confirmed correct.     1/10/2017 8:58 AM CST    Pt here for px and labwork        Well Adult History   Well Adult History             The  patient presents for well adult exam.  The patient's general health status is described as good.  The patient's diet is described as balanced.  Exercise: routine.  Associated symptoms consist of none.  Medical encounters: none.  Compliance problems: none.        Review of Systems   Constitutional:  Negative.    Eye:  Negative.    Ear/Nose/Mouth/Throat:  Negative.    Respiratory:  Negative.    Cardiovascular:  Negative.    Gastrointestinal:  Negative.    Genitourinary:  Negative.    Hematology/Lymphatics:  Negative.    Endocrine:  Negative.    Immunologic:  Negative.    Musculoskeletal:  Negative.    Integumentary:  Negative.    Neurologic:  Negative.    Psychiatric:  Negative.    All other systems reviewed and negative      Health Status   Allergies:    Allergic Reactions (Selected)  No known allergies   Medications:  (Selected)   Prescriptions  Prescribed  atorvastatin 20 mg oral tablet: 1 tab(s) ( 20 mg ), PO, Daily, # 90 tab(s), 3 Refill(s), Type: Maintenance, Pharmacy: [facility], 1 tab(s) po daily   Problem list:    All Problems  Anal Fissure / ICD-9-.0 / Confirmed  CAD (coronary artery disease) / SNOMED CT 0197470263 / Confirmed  Cervical radiculopathy at C6 / SNOMED CT 240763076 / Confirmed  Erectile Dysfunction / SNOMED CT 119917687 / Confirmed  Hypertriglyceridemia / ICD-9-.1 / Confirmed  Obese / ICD-9-.00 / Probable  Osteoarthritis / ICD-9-.90 / Confirmed  Inactive: GERD (Gastroesophageal Reflux Disease) / ICD-9-.81  Resolved: *Hospitalized@[facility] - Cellulitis LLE  Resolved: Arthroscopy of knee / SNOMED CT 917377782  Resolved: Arthroscopy of shoulder / SNOMED CT 860318803  Resolved: Hip replacement / SNOMED CT 9957609610  Resolved: Repair of diaphragmatic hiatal hernia / SNOMED CT 001436833  Resolved: Repair of rotator cuff of shoulder / SNOMED CT 8726785184  Resolved: Resection of clavicle / SNOMED CT 10725038  Canceled: Hyperlipemia / ICD-9-.4      Histories    Past Medical History:    Resolved  *Hospitalized@[facility] - Cellulitis LLE: Onset on 2012 at 52 years.  Resolved.  Repair of diaphragmatic hiatal hernia (764116300):  Resolved in  at 31 years.  Comments:  2010 CDT 4:26 PM CDT - Nancy Sunnyis  Francis Fundaplication  Arthroscopy of knee (419757138):  Resolved in  at 29 years.  Comments:  2010 CDT 4:28 PM CDT Danae Real  Right knee  Resection of clavicle (43142745):  Resolved in  at 35 years.  Comments:  2010 CDT 4:33 PM CDT - Danae Cruz  Left-distal  Arthroscopy of shoulder (236972264):  Resolved in  at 38 years.  Comments:  2010 CDT 4:34 PM CDT Danae Real  right  Repair of rotator cuff of shoulder (9739125909):  Resolved in  at 44 years.  Comments:  2010 CDT 4:37 PM CDT Danae Real  left  Hip replacement (3256167263):  Resolved in  at 46 years.  Comments:  2010 CDT 4:38 PM CDT Danae Real  right   Family History:    Cancer  Father  Diabetes mellitus  Mother ()  Hypertension  Father  Heart disease  Father  Heart attack  Mother ()     Procedure history:    Rotator cuff repair (SNOMED CT 356505122) performed by Jacques Paris MD on 10/17/2016 at 56 Years.  Comments:  10/27/2016 8:27 AM - [place] Holman  Left.    10/6/2016 9:16 AM - Castillo Nogueira MD, Dr.  [facility]  10/17/2016   Right C5-7 Cervical foraminotomy in  at 54 Years.  Comments:  1/15/2015 10:29 AM - Castillo Nogueira MD, Dr. [facility]  Right Hip Arthroscopy and Psoas Tenotomy in  at 53 Years.  Colonoscopy (SNOMED CT 723064641) in  at 52 Years.  Arthroscopy of Shoulder in  at 50 Years.  Comments:  2010 3:19 PM - Castillo Nogueira MD, Dr.  C6-7 Laminectomy in  at 50 Years.  Comments:  2010 3:20 PM - Jero BOWEN Castillo  left   Dr. [facility]  hip replacement in  at 47 Years.  Comments:  2010 4:56 PM - Danae Cruz  Repair of rotator  cuff of shoulder (SNOMED CT 8803937663) in 2005 at 45 Years.  Comments:  6/28/2010 4:58 PM - Danae Cruz  left  Arthroscopy of shoulder (SNOMED CT 299604019) in 1999 at 39 Years.  Comments:  6/28/2010 4:55 PM - Danae Cruz  Right  Resection of clavicle (SNOMED CT 66420609) in 1996 at 36 Years.  Comments:  6/28/2010 5:00 PM - Danae Cruz  left distal  Repair of diaphragmatic hiatal hernia (SNOMED CT 191436282) in 1992 at 32 Years.  Comments:  6/28/2010 4:58 PM - Danae Cruz  Francis Fundaplication  Arthroscopy of knee (SNOMED CT 616951869) in 1990 at 30 Years.  Comments:  6/28/2010 4:54 PM - Danae Cruz   Social History:        Alcohol Assessment: Current            Current, Beer (12 oz), 1-2 times per week, 3 drinks/episode average.      Tobacco Assessment: Denies Tobacco Use      Substance Abuse Assessment: Denies Substance Abuse      Employment and Education Assessment            Employed                     Comments:                      01/15/2015 - Jero BOWEN, [employer]      Home and Environment Assessment            Marital status:  (Living together).  Lives with Self, Spouse.  2 children.  Living situation:               Home/Independent.      Exercise and Physical Activity Assessment: Regular exercise        Physical Examination   Vital signs reviewed  and within acceptable limits    Vital Signs   1/10/2017 8:58 AM CST Peripheral Pulse Rate 72 bpm    Pulse Site Radial artery    HR Method Manual    Systolic Blood Pressure 124 mmHg    Diastolic Blood Pressure 80 mmHg    Mean Arterial Pressure 95 mmHg    BP Site Right arm    BP Method Manual      Measurements from flowsheet : Measurements   1/10/2017 8:58 AM CST Height Measured - Standard 71 in    Weight Measured - Standard 295 lb    BSA 2.59 m2    Body Mass Index 41.14 kg/m2      General:  Alert and oriented, No acute distress.    Eye:  Pupils are equal, round and reactive to light, Extraocular movements are  intact, Normal conjunctiva.    HENT:  Normocephalic, Tympanic membranes are clear, Normal hearing, Oral mucosa is moist, No pharyngeal erythema.    Neck:  Supple, Non-tender, No carotid bruit, No jugular venous distention, No lymphadenopathy, No thyromegaly.    Respiratory:  Lungs are clear to auscultation, Respirations are non-labored, Breath sounds are equal, Symmetrical chest wall expansion, No chest wall tenderness.    Cardiovascular:  Normal rate, Regular rhythm, No murmur, No gallop, Good pulses equal in all extremities, Normal peripheral perfusion, No edema.    Gastrointestinal:  Soft, Non-tender, Non-distended, Normal bowel sounds, No organomegaly.    Lymphatics:  No lymphadenopathy neck, axilla, groin.    Musculoskeletal:  Normal range of motion, Normal strength, No tenderness, No swelling, No deformity, Normal gait.    Integumentary:  Warm, Dry, Pink.    Neurologic:  Normal sensory, Normal motor function, No focal deficits, Cranial Nerves II-XII are grossly intact, Normal deep tendon reflexes.    Psychiatric:  Cooperative, Appropriate mood & affect, Normal judgment.

## 2022-02-15 NOTE — TELEPHONE ENCOUNTER
Entered by Sarah Cervantes CMA on September 04, 2019 9:08:53 AM CDT  ---------------------  From: Sarah Cervantes CMA   To: [facility]    Sent: 9/4/2019 9:08:49 AM CDT  Subject: Medication Management     ** Not Approved: Patient needs appointment **  atorvastatin (ATORVASTATIN 20MG)  TAKE ONE (1) TABLET EACH NIGHT AT BEDTIME FOR CHOLESTEROL  Qty:  90 tab(s)        Days Supply:  90        Refills:  1          Substitutions Allowed     Route To Pharmacy - [facility]   Note from Pharmacy:  Generic For:LIPITOR 20MG  Signed by Sarah Cervantes CMA            ** Patient matched by Sarah Cervantes CMA on 9/4/2019 9:07:43 AM CDT **      ------------------------------------------  From: [facility]  To: Castillo Nogueira MD  Sent: September 4, 2019 9:04:06 AM CDT  Subject: Medication Management  Due: September 5, 2019 9:04:06 AM CDT    ** On Hold Pending Signature **  Drug: atorvastatin (atorvastatin 20 mg oral tablet)  TAKE ONE (1) TABLET EACH NIGHT AT BEDTIME FOR CHOLESTEROL  Quantity: 90 tab(s)     Days Supply: 90        Refills: 1  Substitutions Allowed  Notes from Pharmacy: Generic For:LIPITOR 20MG    Dispensed Drug: atorvastatin (atorvastatin 20 mg oral tablet)  TAKE ONE (1) TABLET EACH NIGHT AT BEDTIME FOR CHOLESTEROL  Quantity: 90 tab(s)     Days Supply: 90        Refills: 1  Substitutions Allowed  Notes from Pharmacy: Generic For:LIPITOR 20MG  ------------------------------------------

## 2022-02-15 NOTE — NURSING NOTE
Comprehensive Intake Entered On:  11/23/2020 9:39 AM CST    Performed On:  11/23/2020 9:33 AM CST by Malcolm COLMENARES, Emily               Summary   Chief Complaint :   preop:  surgery 12/7/2020 at [facility] w/ Dr. Tinoco for left knee replacement--WC related injury.   Weight Measured :   300 lb(Converted to: 300 lb 0 oz, 136.078 kg)    Height Measured :   71 in(Converted to: 5 ft 11 in, 180.34 cm)    Body Mass Index :   41.84 kg/m2 (HI)    Body Surface Area :   2.61 m2   Height/Length Estimated :   71 in(Converted to: 5 ft 11 in, 180.34 cm)    Systolic Blood Pressure :   140 mmHg (HI)    Diastolic Blood Pressure :   74 mmHg   Mean Arterial Pressure :   96 mmHg   Peripheral Pulse Rate :   65 bpm   BP Site :   Right arm   Pulse Site :   Radial artery   BP Method :   Manual   HR Method :   Manual   Temperature Tympanic :   97.7 DegF(Converted to: 36.5 DegC)  (LOW)    Oxygen Saturation :   95 %   Race :      Languages :   English   Ethnicity :   Not  or    Emily Fowler MA - 11/23/2020 9:33 AM CST   Health Status   Allergies Verified? :   Yes   Medication History Verified? :   Yes   Immunizations Current :   Yes   Medical History Verified? :   Yes   Pre-Visit Planning Status :   Completed   Tobacco Use? :   Former smoker   Emily Fowler MA - 11/23/2020 9:33 AM CST   Consents   Consent for Immunization Exchange :   Consent Granted   Consent for Immunizations to Providers :   Consent Granted   Emily Fowler MA - 11/23/2020 9:33 AM CST   Meds / Allergies   (As Of: 11/23/2020 9:39:53 AM CST)   Allergies (Active)   No Known Medication Allergies  Estimated Onset Date:   Unspecified ; Created By:   Elise Lubin LPN; Reaction Status:   Active ; Category:   Drug ; Substance:   No Known Medication Allergies ; Type:   Allergy ; Updated By:   Elise Lubin LPN; Reviewed Date:   10/27/2020 10:55 AM CDT        Medication List   (As Of: 11/23/2020 9:39:53 AM CST)   Prescription/Discharge Order     atorvastatin  :   atorvastatin ; Status:   Prescribed ; Ordered As Mnemonic:   atorvastatin 20 mg oral tablet ; Simple Display Line:   20 mg, 1 tab(s), Oral, daily, 90 tab(s), 1 Refill(s) ; Ordering Provider:   Castillo Nogueira MD; Catalog Code:   atorvastatin ; Order Dt/Tm:   10/6/2020 10:16:26 AM CDT          gemfibrozil  :   gemfibrozil ; Status:   Prescribed ; Ordered As Mnemonic:   gemfibrozil 600 mg oral tablet ; Simple Display Line:   See Instructions, TAKE ONE (1) TABLET BY MOUTH TWO (2) TIMES, 180 tab(s), 0 Refill(s) ; Ordering Provider:   Castillo Nogueira MD; Catalog Code:   gemfibrozil ; Order Dt/Tm:   8/6/2020 1:17:38 PM CDT          losartan  :   losartan ; Status:   Prescribed ; Ordered As Mnemonic:   losartan 50 mg oral tablet ; Simple Display Line:   50 mg, 1 tab(s), Oral, daily, 90 tab(s), 1 Refill(s) ; Ordering Provider:   Castillo Nogueira MD; Catalog Code:   losartan ; Order Dt/Tm:   8/17/2020 4:05:55 PM CDT          sildenafil  :   sildenafil ; Status:   Prescribed ; Ordered As Mnemonic:   sildenafil 20 mg oral tablet ; Simple Display Line:   40 mg, 2 tab(s), Oral, prn, as needed for planned sexual activity, PRN: Other (see comment), 20 tab(s), 5 Refill(s) ; Ordering Provider:   Castillo Nogueira MD; Catalog Code:   sildenafil ; Order Dt/Tm:   9/24/2019 2:56:03 PM CDT            ID Risk Screen   Recent Travel History :   No recent travel   Family Member Travel History :   No recent travel   Other Exposure to Infectious Disease :   Community exposure to COVID-19 within the last 14 days   Malcolm COLMEANRES, Emily - 11/23/2020 9:33 AM CST   Social History   Social History   (As Of: 11/23/2020 9:39:53 AM CST)   Alcohol:  Current      Current, Beer (12 oz), 1-2 times per week, 3 drinks/episode average.   (Last Updated: 2/6/2013 1:15:58 PM CST by Saurabh Rosas)          Tobacco:  Denies Tobacco Use      Former smoker, quit more than 30 days ago   (Last Updated: 11/23/2020 9:37:32 AM CST by Malcolm COLMENARES, Emily)           Electronic Cigarette/Vaping:        Electronic Cigarette Use: Never.   (Last Updated: 11/23/2020 9:37:23 AM CST by Emily Fowler MA)          Substance Abuse:  Denies Substance Abuse      (Last Updated: 1/26/2013 11:03:40 AM CST by Kirk Ramirez MD )         Employment/School:        Employed   Comments:  1/15/2015 10:29 AM - Castillo Nogueira MD: [employer]   (Last Updated: 1/15/2015 10:29:55 AM CST by Castillo Nogueira MD)          Home/Environment:        Marital status:  (Living together).  Lives with Self, Spouse.  2 children.  Living situation: Home/Independent.   (Last Updated: 1/15/2015 10:30:10 AM CST by Castillo Nogueira MD)          Exercise:  Regular exercise      Exercise frequency: Daily.  Exercise type: Walking.   (Last Updated: 3/21/2018 11:42:44 AM CDT by Preeti Henning)

## 2022-02-15 NOTE — PROGRESS NOTES
Patient:   YAZMIN GRANADOS            MRN: [id number]            FIN: [id number]               Age:   58 years     Sex:  Male     :  1960   Associated Diagnoses:   Epistaxis   Author:   Castillo Nogueira MD      Impression and Plan   Diagnosis     Epistaxis (TZL92-UI R04.0).     Course:  Worsening.    Plan:  discontinue Aspirin.    Orders     Orders (Selected)   Outpatient Orders  Order  [id number] control nasal hemorrhage anterior simple (Charge): Quantity: 1, Epistaxis.     Large inflatable Rhinorocket placed on right side.        Visit Information   Visit type:  New symptom.    Accompanied by:  No one.    Source of history:  Self.    History limitation:  None.       Chief Complaint   Chief complaint discussed and confirmed correct.     10/15/2018 10:09 AM CDT  Pt here for bloody nose that started this morning        History of Present Illness             The patient presents with epistaxis.  The epistaxis is coming from the right nare, out of the nare(s).  The epistaxis is described as a moderate amount.  The severity of the epistaxis is moderate.  The epistaxis is constant.  The epistaxis has lasted for 3 hour(s).  There are no modifying factors.  Associated symptoms consist of none.        Review of Systems   Constitutional:  Negative.    Eye:  Negative.    Ear/Nose/Mouth/Throat:  Negative except as documented in history of present illness.    Respiratory:  Negative.    Cardiovascular:  Negative.    Gastrointestinal:  Negative.    Genitourinary:  Negative.    Hematology/Lymphatics:  Negative.    Endocrine:  Negative.    Immunologic:  Negative.    Musculoskeletal:  Negative.    Integumentary:  Negative.    Neurologic:  Negative.    Psychiatric:  Negative.    All other systems reviewed and negative      Health Status   Allergies:    Allergic Reactions (Selected)  No known allergies   Medications:  (Selected)   Prescriptions  Prescribed  atorvastatin 20 mg oral tablet: 1 tab(s) ( 20 mg ), po, daily, # 90 tab(s), 3  Refill(s), Type: Maintenance, Pharmacy: [facility], 1 tab(s) po daily  losartan 50 mg oral tablet: = 1 tab(s) ( 50 mg ), PO, Daily, # 90 tab(s), 1 Refill(s), Type: Maintenance, Pharmacy: [facility], 1 tab(s) Oral daily  naproxen sodium 220 mg oral tablet: 2 tab(s) ( 440 mg ), PO, q8hr, PRN: for pain, # 60 tab(s), 0 Refill(s), Type: Maintenance, OTC (Rx)  Documented Medications  Documented  aspirin 81 mg oral delayed release tablet: 1 tab(s) ( 81 mg ), po, daily, 0 Refill(s), Type: Maintenance   Problem list:    All Problems  Anal Fissure / ICD-9-.0 / Confirmed  Benign essential HTN / SNOMED CT 6745540 / Confirmed  CAD (coronary artery disease) / SNOMED CT 5171262090 / Confirmed  Cervical radiculopathy at C6 / SNOMED CT 069016482 / Confirmed  Erectile Dysfunction / SNOMED CT 985067856 / Confirmed  Hypertriglyceridemia / ICD-9-.1 / Confirmed  Morbid obesity / SNOMED CT 101840876 / Confirmed  Obese / ICD-9-.00 / Probable  Osteoarthritis / ICD-9-.90 / Confirmed  Inactive: GERD (Gastroesophageal Reflux Disease) / ICD-9-.81  Resolved: *Hospitalized@[facility] - Cellulitis LLE  Resolved: Arthroscopy of knee / SNOMED CT 703765196  Resolved: Arthroscopy of shoulder / SNOMED CT 978620236  Resolved: Hip replacement / SNOMED CT 9406273218  Resolved: Repair of diaphragmatic hiatal hernia / SNOMED CT 241944021  Resolved: Repair of rotator cuff of shoulder / SNOMED CT 0254096027  Resolved: Resection of clavicle / SNOMED CT 98397354  Canceled: Hyperlipemia / ICD-9-.4      Histories   Past Medical History:    Resolved  *Hospitalized@[facility] - Cellulitis LLE: Onset on 2/5/2012 at 52 years.  Resolved.  Repair of diaphragmatic hiatal hernia (979336275):  Resolved in 1992 at 31 years.  Comments:  6/28/2010 CDT 4:26 PM CDT - Danae Cruz Fundaplication  Arthroscopy of knee (722387646):  Resolved in 1990 at 29 years.  Comments:  6/28/2010 CDT 4:28 PM CDT - Danae Cruz  knee  Resection of clavicle (38887602):  Resolved in  at 35 years.  Comments:  2010 CDT 4:33 PM CDT - Danae Cruz  Left-distal  Arthroscopy of shoulder (190661360):  Resolved in  at 38 years.  Comments:  2010 CDT 4:34 PM CDT - Danae Cruz  right  Repair of rotator cuff of shoulder (3548642954):  Resolved in  at 44 years.  Comments:  2010 CDT 4:37 PM CDT - Danae Cruz  left  Hip replacement (8936910161):  Resolved in  at 46 years.  Comments:  2010 CDT 4:38 PM CDT - Danae Cruz  right   Family History:    Cancer  Father  Diabetes mellitus  Mother ()  Hypertension  Father  Heart disease  Father  Heart attack  Mother ()     Procedure history:    Rotator cuff repair (SNOMED CT 815089111) performed by Jacques Paris MD on 10/17/2016 at 56 Years.  Comments:  10/27/2016 8:27 AM - [place] Holman.    10/6/2016 9:16 AM - Castillo Nogueira MD, Dr.  [facility]  10/17/2016   Right C5-7 Cervical foraminotomy in  at 54 Years.  Comments:  1/15/2015 10:29 AM - Castillo Nogueira MD, Dr. [facility]  Right Hip Arthroscopy and Psoas Tenotomy in  at 53 Years.  Colonoscopy (SNOMED CT 133471763) in  at 52 Years.  Arthroscopy of Shoulder in  at 50 Years.  Comments:  2010 3:19 PM - Castillo Nogueira MD, Dr.  C6-7 Laminectomy in  at 50 Years.  Comments:  2010 3:20 PM - Castillo Nogueira MD, Dr.  [facility]  hip replacement in  at 47 Years.  Comments:  2010 4:56 PM - Sunny Cruzis  Right  Repair of rotator cuff of shoulder (SNOMED CT 0842152567) in  at 45 Years.  Comments:  2010 4:58 PM - Sunny Cruzis  left  Arthroscopy of shoulder (SNOMED CT 934025707) in  at 39 Years.  Comments:  2010 4:55 PM - Danae Cruz  Right  Resection of clavicle (SNOMED CT 59086265) in  at 36 Years.  Comments:  2010 5:00 PM - Danae Cruz  left distal  Repair of diaphragmatic hiatal hernia (SNOMED  CT 296287416) in 1992 at 32 Years.  Comments:  6/28/2010 4:58 PM - Danae Cruz Fundaplication  Arthroscopy of knee (SNOMED CT 546071548) in 1990 at 30 Years.  Comments:  6/28/2010 4:54 PM - Danae Cruz  Right      Physical Examination   Vital signs reviewed  and within acceptable limits    Vital Signs   10/15/2018 10:09 AM CDT  Vital Signs Comments      Unable to do vitals as pt is needing to stand over sink due to blood     Measurements from flowsheet : Measurements   10/15/2018 10:09 AM CDT  Ht/Wt Measurement Refused by Patient?     Yes     General:  No acute distress.    Eye:  Pupils are equal, round and reactive to light, Extraocular movements are intact, Normal conjunctiva.    HENT:       Nose: Right nostril, Epistaxis ( Anterior ), bleeding site not identified with nasal speculum direct inspection.    Neck:  Supple, Non-tender, No carotid bruit, No jugular venous distention, No lymphadenopathy, No thyromegaly.    Respiratory:  Lungs are clear to auscultation, Respirations are non-labored.    Cardiovascular:  Normal rate, Regular rhythm, No murmur, Normal peripheral perfusion, No edema.    Integumentary:  Warm, Dry, Pink.    Neurologic:  Alert, Oriented.    Psychiatric:  Cooperative, Appropriate mood & affect, Normal judgment.

## 2022-02-15 NOTE — TELEPHONE ENCOUNTER
---------------------  From: Castillo Nogueira MD   To: YAZMIN GRANADOS    Sent: 10/28/2020 9:29:10 AM CDT  Subject: General Message       All results are within acceptable limits. No treatment changes are recommended at this time.      Results:  Date Result Name Ind Value Ref Range   10/27/2020 11:19 AM Sodium Level  138 mmol/L (135 - 146)   10/27/2020 11:19 AM Potassium Level  4.1 mmol/L (3.5 - 5.3)   10/27/2020 11:19 AM Chloride Level  104 mmol/L (98 - 110)   10/27/2020 11:19 AM CO2 Level  24 mmol/L (20 - 32)   10/27/2020 11:19 AM Glucose Level ((H)) 102 mg/dL (65 - 99)   10/27/2020 11:19 AM BUN  18 mg/dL (7 - 25)   10/27/2020 11:19 AM Creatinine Level  0.84 mg/dL (0.70 - 1.33)   10/27/2020 11:19 AM BUN/Creat Ratio  NOT APPLICABLE (6 - 22)   10/27/2020 11:19 AM eGFR  96 mL/min/1.73m2 (> OR = 60 - )   10/27/2020 11:19 AM eGFR African American  111 mL/min/1.73m2 (> OR = 60 - )   10/27/2020 11:19 AM Calcium Level  9.4 mg/dL (8.6 - 10.3)   10/27/2020 11:19 AM Bilirubin Total  0.5 mg/dL (0.2 - 1.2)   10/27/2020 11:19 AM Alkaline Phosphatase  80 unit/L (35 - 144)   10/27/2020 11:19 AM AST/SGOT  17 unit/L (10 - 35)   10/27/2020 11:19 AM ALT/SGPT  19 unit/L (9 - 46)   10/27/2020 11:19 AM Protein Total  7.6 gm/dL (6.1 - 8.1)   10/27/2020 11:19 AM Albumin Level  4.5 gm/dL (3.6 - 5.1)   10/27/2020 11:19 AM Globulin  3.1 (1.9 - 3.7)   10/27/2020 11:19 AM A/G Ratio  1.5 (1.0 - 2.5)   10/27/2020 11:19 AM Cholesterol  160 mg/dL ( - <200)   10/27/2020 11:19 AM Non-HDL Cholesterol  114 ( - <130)   10/27/2020 11:19 AM HDL  46 mg/dL (> OR = 40 - )   10/27/2020 11:19 AM Cholesterol/HDL Ratio  3.5 ( - <5.0)   10/27/2020 11:19 AM LDL  81    10/27/2020 11:19 AM Triglyceride ((H)) 249 mg/dL ( - <150)   10/27/2020 11:19 AM TSH  3.59 mIU/L (0.40 - 4.50)   10/27/2020 11:19 AM WBC  4.7 (3.8 - 10.8)   10/27/2020 11:19 AM RBC  4.83 (4.20 - 5.80)   10/27/2020 11:19 AM Hgb  14.4 gm/dL (13.2 - 17.1)   10/27/2020 11:19 AM Hct  40.7 % (38.5 - 50.0)    10/27/2020 11:19 AM MCV  84.3 fL (80.0 - 100.0)   10/27/2020 11:19 AM MCH  29.8 pg (27.0 - 33.0)   10/27/2020 11:19 AM MCHC  35.4 gm/dL (32.0 - 36.0)   10/27/2020 11:19 AM RDW  12.8 % (11.0 - 15.0)   10/27/2020 11:19 AM Platelet  231 (140 - 400)   10/27/2020 11:19 AM MPV  11.2 fL (7.5 - 12.5)

## 2022-02-15 NOTE — NURSING NOTE
Quick Intake Entered On:  10/27/2020 11:00 AM CDT    Performed On:  10/27/2020 11:00 AM CDT by Castillo Nogueira MD               Summary   Height Measured :   71 in(Converted to: 5 ft 11 in, 180.34 cm)    Height/Length Estimated :   71 in(Converted to: 5 ft 11 in, 180.34 cm)    Systolic Blood Pressure :   138 mmHg (HI)    Race :      Languages :   English   Ethnicity :   Not  or    Castillo Nogueira MD - 10/27/2020 11:00 AM CDT

## 2022-02-15 NOTE — TELEPHONE ENCOUNTER
Entered by Sarah Cervantes CMA on August 17, 2020 4:06:13 PM CDT  ---------------------  From: Sarah Cervantes CMA   To: [facility]    Sent: 8/17/2020 4:06:13 PM CDT  Subject: Medication Management     ** Submitted: **  Order:losartan (losartan 50 mg oral tablet)  1 tab(s)  Oral  daily  Qty:  90 tab(s)        Refills:  1          Substitutions Allowed     Route To [facility]    Signed by Sarah Cervantes CMA  8/17/2020 9:05:00 PM [facility]    ** Submitted: **  Complete:losartan (losartan 50 mg oral tablet)   Signed by Sarah Cervantes CMA  8/17/2020 9:06:00 PM [facility]    ** Not Approved:  **  losartan (LOSARTAN POT 50MG)  TAKE ONE (1) TABLET DAILY  Qty:  90 tab(s)        Days Supply:  30        Refills:  0          Substitutions Allowed     Route To [facility]   Note from Pharmacy:  * * N O T I C E * * Last quantity doesn't match original quantity  Signed by Sarah Cervantes CMA            ** Patient matched by Sarah Cervantes CMA on 8/17/2020 4:05:48 PM CDT **      ------------------------------------------  From: [facility]  To: Castillo Nogueira MD  Sent: August 17, 2020 12:18:42 PM CDT  Subject: Medication Management  Due: August 12, 2020 4:14:54 PM CDT     ** On Hold Pending Signature **     Drug: losartan (losartan 50 mg oral tablet), TAKE ONE (1) TABLET DAILY  Quantity: 90 tab(s)  Days Supply: 30  Refills: 0  Substitutions Allowed  Notes from Pharmacy:     Dispensed Drug: losartan (losartan 50 mg oral tablet), TAKE ONE (1) TABLET DAILY  Quantity: 90 tab(s)  Days Supply: 30  Refills: 0  Substitutions Allowed  Notes from Pharmacy: * * N O T I C E * * Last quantity doesn t match original quantity  ------------------------------------------

## 2022-02-15 NOTE — PROGRESS NOTES
Patient:   YAZMIN GRANADOS            MRN: [id number]            FIN: [id number]               Age:   60 years     Sex:  Male     :  1960   Associated Diagnoses:   Osteoarthritis   Author:   Jero BOWEN, Castillo      Impression and Plan   Diagnosis     Osteoarthritis (WKU73-SM M17.12).     Orders     Orders   Charges (Evaluation and Management):  67618 office outpatient visit 15 minutes (Charge) (Order): Quantity: 1, Osteoarthritis.        Preoperative Information   Indication for surgery:  Musculoskeletal disorder.         Associated symptoms: End Stage osteoarthritis of the left knee with intractable pain -  no longer responding to conservative treatments.    Accompanied by:  No one.    Source of history:  Self.    History limitation:  None.       Chief Complaint   Chief complaint discussed and confirmed correct.     2020 9:33 AM CST   preop:  surgery 2020 at [facility] w/ Dr. Tinoco for left knee replacement--WC related injury.        Review of Systems   Constitutional:  Negative.    Eye:  Negative.    Ear/Nose/Mouth/Throat:  Negative.    Respiratory:  Negative.    Cardiovascular:  Negative.    Gastrointestinal:  Negative.    Genitourinary:  Negative.    Hematology/Lymphatics:  Negative.    Endocrine:  Negative.    Immunologic:  Negative.    Musculoskeletal:  Negative.    Integumentary:  Negative.    Neurologic:  Negative.    Psychiatric:  Negative.    All other systems reviewed and negative   No personal or family history of anesthesia reactions  No history of bleeding or blood clotting disorders  No history of heart murmur but takes prophylactic antibiotics with procedures due to right hip replacement.  No history of blood transfusion  Patient recently tested positive for SARS-CoV2 on 2020. Now completely recovered.      Health Status   Allergies:    Allergic Reactions (Selected)  No Known Medication Allergies   Medications:  (Selected)   Prescriptions  Prescribed  atorvastatin 20 mg oral tablet: = 1  tab(s) ( 20 mg ), Oral, daily, # 90 tab(s), 1 Refill(s), Type: Maintenance, Pharmacy: [facility], 1 tab(s) Oral daily, 71, in, 08/17/20 15:40:00 CDT, Height Measured, 301, lb, 08/17/20 15:40:00 CDT, Weight Measured  gemfibrozil 600 mg oral tablet: See Instructions, Instructions: TAKE ONE (1) TABLET BY MOUTH TWO (2) TIMES, # 180 tab(s), 0 Refill(s), Type: Maintenance, Pharmacy: [facility], 71, in, 06/24/20 10:35:00 CDT, Height Measured, 301.6, lb, 06/24/20 10:35:00 CDT, Weight Measured  losartan 50 mg oral tablet: = 1 tab(s) ( 50 mg ), Oral, daily, # 90 tab(s), 1 Refill(s), Type: Maintenance, Pharmacy: [facility], 71, in, 08/17/20 15:40:00 CDT, Height Measured, 301, lb, 08/17/20 15:40:00 CDT, Weight Measured  sildenafil 20 mg oral tablet: = 2 tab(s) ( 40 mg ), Oral, prn, Instructions: as needed for planned sexual activity, PRN: Other (see comment), # 20 tab(s), 5 Refill(s), Type: Maintenance, Pharmacy: [facility], 2 tab(s) Oral prn,PRN:Other (see comment),Instr:as needed for pl...   Problem list:    All Problems  Anal Fissure / ICD-9-.0 / Confirmed  Benign essential HTN / SNOMED CT 0964397 / Confirmed  CAD (coronary artery disease) / SNOMED CT 1374764647 / Confirmed  Cervical radiculopathy at C6 / SNOMED CT 082112156 / Confirmed  Erectile Dysfunction / SNOMED CT 362715944 / Confirmed  Hypertriglyceridemia / ICD-9-.1 / Confirmed  Morbid obesity / SNOMED CT 048386092 / Confirmed  Obese / ICD-9-.00 / Probable  Osteoarthritis / ICD-9-.90 / Confirmed  Inactive: GERD (Gastroesophageal Reflux Disease) / ICD-9-.81  Resolved: *Hospitalized@[facility] - Cellulitis LLE  Resolved: Arthroscopy of knee / SNOMED CT 715880299  Resolved: Arthroscopy of shoulder / SNOMED CT 130223702  Resolved: Hip replacement / SNOMED CT 2444456882  Resolved: Repair of diaphragmatic hiatal hernia / SNOMED CT 534742147  Resolved: Repair of rotator cuff of shoulder / SNOMED CT  1067393371  Resolved: Resection of clavicle / SNOMED CT 28856934  Canceled: Hyperlipemia / ICD-9-.4      Histories   Past Medical History:    Resolved  *Hospitalized@[facility] - Cellulitis LLE: Onset on 2012 at 52 years.  Resolved.  Repair of diaphragmatic hiatal hernia (881330380):  Resolved in  at 31 years.  Comments:  2010 CDT 4:26 PM CDT - Nancy Danae  Francis Fundaplication  Arthroscopy of knee (566687849):  Resolved in  at 29 years.  Comments:  2010 CDT 4:28 PM CDT - Nancy Danae  Right knee  Resection of clavicle (60734091):  Resolved in  at 35 years.  Comments:  2010 CDT 4:33 PM CDT Jefe Cruz Danae  Left-distal  Arthroscopy of shoulder (887506131):  Resolved in  at 38 years.  Comments:  2010 CDT 4:34 PM CDT Danae Real  right  Repair of rotator cuff of shoulder (9442899720):  Resolved in  at 44 years.  Comments:  2010 CDT 4:37 PM CDT Jefe Cruz Danae  left  Hip replacement (9964246894):  Resolved in  at 46 years.  Comments:  2010 CDT 4:38 PM CDT Jefe Cruz , Danae  right   Family History:    Cancer  Father  Diabetes mellitus  Mother ()  Hypertension  Father  Heart disease  Father  Heart attack  Mother ()     Procedure history:    Rotator cuff repair (SNOMED CT 041673627) performed by Jacques Paris MD on 10/17/2016 at 56 Years.  Comments:  10/27/2016 8:27 AM CDT - [place]  Left.    10/6/2016 9:16 AM DANIELLE - Castillo Nogueira MD, Dr.  [facility]  10/17/2016   Right C5-7 Cervical foraminotomy in  at 54 Years.  Comments:  1/15/2015 10:29 AM Castillo Garg MD, Dr. [facility]  Right Hip Arthroscopy and Psoas Tenotomy in  at 53 Years.  Colonoscopy (SNOMED CT 383260768) in  at 52 Years.  Arthroscopy of Shoulder in  at 50 Years.  Comments:  2010 3:19 PM DANIELLE Nogueira MD, Castillo Ryder  C6-7 Laminectomy in 2010 at 50 Years.  Comments:  2010 3:20 PM Castillo Kapoor MD    [facility]  hip replacement in 2007 at 47 Years.  Comments:  6/28/2010 4:56 PM CDT Jefe Cruz Sunnyis  Right  Repair of rotator cuff of shoulder (SNOMED CT 1215588023) in 2005 at 45 Years.  Comments:  6/28/2010 4:58 PM DANIELLE Mayberry Cruz Sunnyis  left  Arthroscopy of shoulder (SNOMED CT 628206646) in 1999 at 39 Years.  Comments:  6/28/2010 4:55 PM CDT Jefe Danae Cruz  Right  Resection of clavicle (SNOMED CT 49213283) in 1996 at 36 Years.  Comments:  6/28/2010 5:00 PM CDT - Nancy Sunnyis  left distal  Repair of diaphragmatic hiatal hernia (SNOMED CT 179491532) in 1992 at 32 Years.  Comments:  6/28/2010 4:58 PM DANIELLE Mayberry Nancy Danae  Francis Fundaplication  Arthroscopy of knee (SNOMED CT 334197791) in 1990 at 30 Years.  Comments:  6/28/2010 4:54 PM CDT Jefe Danae Cruz     Social History:        Electronic Cigarette/Vaping Assessment            Electronic Cigarette Use: Never.      Alcohol Assessment: Current            Current, Beer (12 oz), 1-2 times per week, 3 drinks/episode average.      Tobacco Assessment: Denies Tobacco Use            Former smoker, quit more than 30 days ago      Substance Abuse Assessment: Denies Substance Abuse      Employment and Education Assessment            Employed                     Comments:                      01/15/2015 - Jero BOWEN, Castillo [employer]      Home and Environment Assessment            Marital status:  (Living together).  Lives with Self, Spouse.  2 children.  Living situation:               Home/Independent.      Exercise and Physical Activity Assessment: Regular exercise            Exercise frequency: Daily.  Exercise type: Walking.        Physical Examination   Vital signs reviewed  and within acceptable limits    Vital Signs   11/23/2020 9:33 AM CST Temperature Tympanic 97.7 DegF  LOW    Peripheral Pulse Rate 65 bpm    Pulse Site Radial artery    HR Method Manual    Systolic Blood Pressure 140 mmHg  HI    Diastolic Blood Pressure 74  mmHg    Mean Arterial Pressure 96 mmHg    BP Site Right arm    BP Method Manual    Oxygen Saturation 95 %      Measurements from flowsheet : Measurements   11/23/2020 9:33 AM CST Height Measured - Standard 71 in    Height/Length Estimated 71 in    Weight Measured - Standard 300 lb    BSA 2.61 m2    Body Mass Index 41.84 kg/m2  HI      General:  Alert and oriented, No acute distress.    Eye:  Pupils are equal, round and reactive to light, Extraocular movements are intact, Normal conjunctiva.    HENT:  Normocephalic, Tympanic membranes are clear, Normal hearing, Oral mucosa is moist, No pharyngeal erythema.    Neck:  Supple, Non-tender, No carotid bruit, No jugular venous distention, No lymphadenopathy, No thyromegaly.    Respiratory:  Lungs are clear to auscultation, Respirations are non-labored, Breath sounds are equal, Symmetrical chest wall expansion, No chest wall tenderness.    Cardiovascular:  Normal rate, Regular rhythm, No murmur, No gallop, Good pulses equal in all extremities, Normal peripheral perfusion, No edema.    Gastrointestinal:  Soft, Non-tender, Non-distended, Normal bowel sounds, No organomegaly.    Lymphatics:  No lymphadenopathy neck, axilla, groin.    Musculoskeletal:  mild limp due to end stage arthritis of left knee.    Integumentary:  Warm, Dry, Pink.    Neurologic:  Normal sensory, Normal motor function, No focal deficits, Cranial Nerves II-XII are grossly intact, Normal deep tendon reflexes.    Psychiatric:  Cooperative, Appropriate mood & affect, Normal judgment.       Review / Management   Results review:  Lab results   10/27/2020 11:19 AM CDT Sodium Level 138 mmol/L    Potassium Level 4.1 mmol/L    Chloride Level 104 mmol/L    CO2 Level 24 mmol/L    Glucose Level 102 mg/dL  HI    BUN 18 mg/dL    Creatinine 0.84 mg/dL    BUN/Creat Ratio NOT APPLICABLE    eGFR 96 mL/min/1.73m2    eGFR African American 111 mL/min/1.73m2    Calcium Level 9.4 mg/dL    Bili Total 0.5 mg/dL    Alk Phos 80  unit/L    AST/SGOT 17 unit/L    ALT/SGPT 19 unit/L    Protein Total 7.6 gm/dL    Albumin Level 4.5 gm/dL    Globulin 3.1    A/G Ratio 1.5    Cholesterol 160 mg/dL    Non-    HDL 46 mg/dL    Chol/HDL Ratio 3.5    LDL 81    Triglyceride 249 mg/dL  HI    TSH 3.59 mIU/L    WBC 4.7    RBC 4.83    Hgb 14.4 gm/dL    Hct 40.7 %    MCV 84.3 fL    MCH 29.8 pg    MCHC 35.4 gm/dL    RDW 12.8 %    Platelet 231    MPV 11.2 fL   .

## 2022-02-15 NOTE — PROGRESS NOTES
Patient:   YAZMIN GRANADOS            MRN: [id number]            FIN: [id number]               Age:   57 years     Sex:  Male     :  1960   Associated Diagnoses:   Hypertriglyceridemia; Cervical strain, acute   Author:   Castillo Nogueira MD      Impression and Plan   Diagnosis     Hypertriglyceridemia (YMB71-XB E78.1).     Course:  Progressing as expected.    Orders     Orders   Charges (Evaluation and Management):  50229 office outpatient visit 25 minutes (Charge) (Order): Quantity: 1, Hypertriglyceridemia  Cervical strain, acute.     Orders (Selected)   Prescriptions  Prescribed  atorvastatin 20 mg oral tablet: 1 tab(s) ( 20 mg ), po, daily, # 90 tab(s), 3 Refill(s), Type: Maintenance, Pharmacy: [facility], 1 tab(s) po daily.     Diagnosis     Cervical strain, acute (IVB68-CN S16.1XXA).     Course:  Worsening.    Plan:  follow up for PT referral if not better in 1-2 weeks.    Orders     Orders (Selected)   Prescriptions  Prescribed  cyclobenzaprine 10 mg oral tablet: 1 tab(s) ( 10 mg ), PO, q8 hrs, PRN: for spasm, # 30 tab(s), 0 Refill(s), Type: Maintenance, Pharmacy: [facility], 1 tab(s) po q8 hrs,PRN:for spasm  Prescribe  naproxen sodium 220 mg oral tablet: 2 tab(s) ( 440 mg ), PO, q8hr, PRN: for pain, # 60 tab(s), 0 Refill(s), Type: Maintenance, OTC (Rx).        Visit Information   Visit type:  New symptom.    Accompanied by:  No one.    Source of history:  Self.    History limitation:  None.       Chief Complaint   5/10/2017 3:19 PM CDT    Pt here for neck pain        History of Present Illness             The patient presents with neck pain.  The location of the neck pain is the right, posterior.  The neck pain is described as aching, cramping, spasmodic and tight.  The severity of the neck pain is moderate.  The neck pain is constant.  The neck pain has lasted for 10 day(s).  Radiation of pain: none.  The context of the neck pain: occurred unknown cause.  Exacerbating factors consist of  changing position and turning head.  Relieving factors consist of analgesics and rest.  Associated symptoms consist of none.        Interval History   Cholesterol Management   Total cholesterol results < 200 mg/dL (optimal).  LDL cholesterol results < 100 mg/dL (optimal).  Triglyceride results 150-199 mg/dL (borderline high).  The course is progressing as expected.  The effect on daily activities is no change in activity level and no change in eating habits.  Associated symptoms characterized by no fatigue, chest pain, joint pain, muscle weakness or myalgias.        Review of Systems   Constitutional:  Negative.    Eye:  Negative.    Ear/Nose/Mouth/Throat:  Negative.    Respiratory:  Negative.    Cardiovascular:  Negative.    Gastrointestinal:  Negative.    Genitourinary:  Negative.    Hematology/Lymphatics:  Negative.    Endocrine:  Negative.    Immunologic:  Negative.    Musculoskeletal:  Negative except as documented in history of present illness.    Integumentary:  Negative.    Neurologic:  Negative.    Psychiatric:  Negative.    All other systems reviewed and negative      Health Status   Allergies:    Allergic Reactions (Selected)  No known allergies   Medications:  (Selected)   Prescriptions  Prescribed  atorvastatin 20 mg oral tablet: 1 tab(s) ( 20 mg ), po, daily, # 90 tab(s), 3 Refill(s), Type: Maintenance, Pharmacy: [facility], 1 tab(s) po daily  cyclobenzaprine 10 mg oral tablet: 1 tab(s) ( 10 mg ), PO, q8 hrs, PRN: for spasm, # 30 tab(s), 0 Refill(s), Type: Maintenance, Pharmacy: [facility], 1 tab(s) po q8 hrs,PRN:for spasm   Problem list:    All Problems (Selected)  Anal Fissure / ICD-9-.0 / Confirmed  CAD (coronary artery disease) / SNOMED CT 2080945734 / Confirmed  Cervical radiculopathy at C6 / SNOMED CT 410088627 / Confirmed  Erectile Dysfunction / SNOMED CT 152334907 / Confirmed  Hypertriglyceridemia / ICD-9-.1 / Confirmed  Obese / ICD-9-.00 /  Probable  Osteoarthritis / ICD-9-.90 / Confirmed      Histories   Past Medical History:    Resolved  *Hospitalized@[facility] - Cellulitis LLE: Onset on 2012 at 52 years.  Resolved.  Repair of diaphragmatic hiatal hernia (269304493):  Resolved in  at 31 years.  Comments:  2010 CDT 4:26 PM CDT - Nancy Danae  Francis Fundaplication  Arthroscopy of knee (844786026):  Resolved in  at 29 years.  Comments:  2010 CDT 4:28 PM CDT - Danae Cruz   knee  Resection of clavicle (33392297):  Resolved in  at 35 years.  Comments:  2010 CDT 4:33 PM CDT - Nancy Danae  Left-distal  Arthroscopy of shoulder (563947031):  Resolved in  at 38 years.  Comments:  2010 CDT 4:34 PM CDT Danae Real  right  Repair of rotator cuff of shoulder (8342651645):  Resolved in  at 44 years.  Comments:  2010 CDT 4:37 PM CDT Jefe Cruz Danae  left  Hip replacement (7796803510):  Resolved in  at 46 years.  Comments:  2010 CDT 4:38 PM CDT Jefe Cruz Danae     Family History:    Cancer  Father  Diabetes mellitus  Mother ()  Hypertension  Father  Heart disease  Father  Heart attack  Mother ()     Procedure history:    Rotator cuff repair (SNOMED CT 297482999) performed by Jacques Paris MD on 10/17/2016 at 56 Years.  Comments:  10/27/2016 8:27 AM - [place] Holman  Left.    10/6/2016 9:16 AM - Castillo Nogueira MD, Dr.  [facility]  10/17/2016   Right C5-7 Cervical foraminotomy in  at 54 Years.  Comments:  1/15/2015 10:29 AM - Castillo Nogueira MD, Dr. [facility]  Right Hip Arthroscopy and Psoas Tenotomy in  at 53 Years.  Colonoscopy (SNOMED CT 654819424) in  at 52 Years.  Arthroscopy of Shoulder in  at 50 Years.  Comments:  2010 3:19 PM - Castillo Nogueira MD, Dr.  C6-7 Laminectomy in  at 50 Years.  Comments:  2010 3:20 PM - Jero BOWEN, Castillo  left   Dr. [facility]  hip replacement in  at 47  Years.  Comments:  6/28/2010 4:56 PM - Danae Cruz  Right  Repair of rotator cuff of shoulder (SNOMED CT 2605266549) in 2005 at 45 Years.  Comments:  6/28/2010 4:58 PM - Danae Cruz  left  Arthroscopy of shoulder (SNOMED CT 052212501) in 1999 at 39 Years.  Comments:  6/28/2010 4:55 PM - Danae Cruz  Right  Resection of clavicle (SNOMED CT 27141221) in 1996 at 36 Years.  Comments:  6/28/2010 5:00 PM - Danae Cruz  left distal  Repair of diaphragmatic hiatal hernia (SNOMED CT 433551430) in 1992 at 32 Years.  Comments:  6/28/2010 4:58 PM - Danae Cruz  Francis Fundaplication  Arthroscopy of knee (SNOMED CT 814217151) in 1990 at 30 Years.  Comments:  6/28/2010 4:54 PM - Danae Cruz  Right   Social History:        Alcohol Assessment: Current            Current, Beer (12 oz), 1-2 times per week, 3 drinks/episode average.      Tobacco Assessment: Denies Tobacco Use      Substance Abuse Assessment: Denies Substance Abuse      Employment and Education Assessment            Employed                     Comments:                      01/15/2015 - Jero BOWEN, Castillo [employer]      Home and Environment Assessment            Marital status:  (Living together).  Lives with Self, Spouse.  2 children.  Living situation:               Home/Independent.      Exercise and Physical Activity Assessment: Regular exercise        Physical Examination   Vital Signs   5/10/2017 3:19 PM CDT Peripheral Pulse Rate 68 bpm    Pulse Site Radial artery    HR Method Manual    Systolic Blood Pressure 124 mmHg    Diastolic Blood Pressure 76 mmHg    Mean Arterial Pressure 92 mmHg    BP Site Right arm    BP Method Manual      Measurements from flowsheet : Measurements   5/10/2017 3:19 PM CDT    Weight Measured - Standard                304.6 lb     General:  Alert and oriented X 3, No acute distress, Warm, Pink, Intact.         Appearance: Within normal limits, Well nourished, Calm.         Hydration: Within normal  limits.         Psych: Within normal limits, Appropriate mood and affect, Cooperative, Normal judgment.    Musculoskeletal:       Spine/torso exam: Cervical ( Right, Posterior, No deformity, No erythema, No ecchymosis, No mass, No nodule, No swelling, Tenderness, No wound, No crepitus, No numbness, No tingling, Strength  5  out of 5, Range of motion ( Diminished ) ).    Neurologic:  Normal sensory, Normal motor function, No focal deficits.

## 2022-02-15 NOTE — TELEPHONE ENCOUNTER
Entered by Gail Johansen CMA on August 06, 2020 1:17:54 PM CDT  ---------------------  From: Gail Johansen CMA   To: [facility]    Sent: 8/6/2020 1:17:54 PM CDT  Subject: Medication Management     ** Submitted: **  Order:gemfibrozil (gemfibrozil 600 mg oral tablet)  See Instructions  TAKE ONE (1) TABLET BY MOUTH TWO (2) TIMES  Qty:  180 tab(s)        Days Supply:  30        Refills:  0          Substitutions Allowed     Route To [facility]    Signed by Gail Johansen CMA  8/6/2020 6:17:00 PM [facility]    ** Submitted: **  Complete:gemfibrozil (gemfibrozil 600 mg oral tablet)   Signed by Gail Johansen CMA  8/6/2020 6:17:00 PM [facility]    ** Not Approved:  **  gemfibrozil (GEMFIBROZIL 600MG)  TAKE ONE (1) TABLET BY MOUTH TWO (2) TIMES  Qty:  180 tab(s)        Days Supply:  30        Refills:  0          Substitutions Allowed     Route To [facility]   Note from Pharmacy:  * * N O T I C E * * Last quantity doesn't match original quantity  Signed by Gail Johansen CMA            ------------------------------------------  From: [facility]  To: Castillo Nogueira MD  Sent: August 6, 2020 11:18:59 AM CDT  Subject: Medication Management  Due: July 28, 2020 10:28:08 PM CDT     ** On Hold Pending Signature **     Drug: gemfibrozil (gemfibrozil 600 mg oral tablet), TAKE ONE (1) TABLET BY MOUTH TWO (2) TIMES  Quantity: 180 tab(s)  Days Supply: 30  Refills: 0  Substitutions Allowed  Notes from Pharmacy:     Dispensed Drug: gemfibrozil (gemfibrozil 600 mg oral tablet), TAKE ONE (1) TABLET BY MOUTH TWO (2) TIMES  Quantity: 180 tab(s)  Days Supply: 30  Refills: 0  Substitutions Allowed  Notes from Pharmacy: * * N O T I C E * * Last quantity doesn t match original quantity  ------------------------------------------

## 2022-02-15 NOTE — PROGRESS NOTES
Patient:   YAZMIN GRANADOS            MRN: [id number]            FIN: [id number]               Age:   57 years     Sex:  Male     :  1960   Associated Diagnoses:   Rotator cuff syndrome of right shoulder   Author:   Modesto Barbosa MD      Chief Complaint   2017 12:46 PM CDT   f/up right shoulder injury.  little to no improvement.      History of Present Illness   chief complaint and symptoms as noted above confirmed with patient   Minimal change with pt  previous surgery on it      Review of Systems   Constitutional:  Negative except as documented in history of present illness.    Musculoskeletal:  Negative except as documented in history of present illness.    Integumentary:  Negative.    Neurologic:  Negative.       Physical Examination   Vital Signs   2017 12:46 PM CDT Temperature Tympanic 98.1 DegF    Peripheral Pulse Rate 75 bpm    Systolic Blood Pressure 129 mmHg    Diastolic Blood Pressure 82 mmHg    Mean Arterial Pressure 98 mmHg      Measurements from flowsheet : Measurements   2017 12:46 PM CDT   Weight Measured - Standard                302.8 lb     General:  Alert and oriented, No acute distress.    Musculoskeletal:       Upper extremity exam: Shoulder ( Right, Rotator cuff, Tenderness, Range of motion ( Diminished ), very limited motion because of pain   passively can move to 90deg flex and abd  distal cms otherwise intact    ).    Neurologic:  Alert, Oriented.       Impression and Plan   Diagnosis     Rotator cuff syndrome of right shoulder ([id number] M75.101).     Course:  Not well controlled.    Plan:  likely tear  get mri  work restrict  ortho fu likely.         Refer to: Physical therapy.    Patient Instructions:       Counseled: Patient, Regarding treatment, Regarding diagnosis, Regarding medications, Activity.

## 2022-02-15 NOTE — LETTER
(Inserted Image. Unable to display)       August 06, 2019      YAZMIN GRANADOS   [address] [id number]          Dear YAZMIN,      Thank you for selecting [facility] for your healthcare needs.     Our records indicate you are due for the following services:    Fasting Lab Tests ~ Please do not eat or drink anything 10 hours prior to your scheduled appointment time.  (Water and any medications that you may need are allowed unless directed otherwise.)    If you had your labs done at another facility or with Direct Access Lab Testing at [facility], please bring in a copy of the results to your next visit, mail a copy, or drop off a copy of your results to your Healthcare Provider.    Hypertension Check ~ Please remember to bring any at-home blood pressure readings with you to your appointment.    You are due for lab work and an office visit; please schedule the lab appointment 1 week before the office visit.  This will assure all results are available to discuss with your Healthcare Provider during your visit.    **It is very helpful if you bring your medication bottles to your appointment.  This assures we have all of your current medications, including strength and dosing information, documented accurately in your medical record.    To schedule an appointment or if you have further questions, please contact your primary clinic:   [facility]       [phone]   [facility]       [phone]              [facility]     [phone]    Powered by [employer]    Sincerely,    Castillo Nogueira MD

## 2022-02-15 NOTE — TELEPHONE ENCOUNTER
---------------------  From: Castillo Nogueira MD   To: YAZMIN GRANADOS    Sent: 2/6/2019 9:14:29 AM CST      All results are within acceptable limits. No treatment changes are recommended at this time.    Results:  Date Result Name Ind Value Ref Range   2/5/2019 9:25 AM Sodium Level  142 mmol/L (135 - 146)   2/5/2019 9:25 AM Potassium Level  4.1 mmol/L (3.5 - 5.3)   2/5/2019 9:25 AM Chloride Level  109 mmol/L (98 - 110)   2/5/2019 9:25 AM CO2 Level  21 mmol/L (20 - 32)   2/5/2019 9:25 AM Glucose Level ((H)) 100 mg/dL (65 - 99)   2/5/2019 9:25 AM BUN  20 mg/dL (7 - 25)   2/5/2019 9:25 AM Creatinine Level  0.74 mg/dL (0.70 - 1.33)   2/5/2019 9:25 AM BUN/Creat Ratio  NOT APPLICABLE (6 - 22)   2/5/2019 9:25 AM eGFR  102 mL/min/1.73m2 (> OR = 60 - )   2/5/2019 9:25 AM eGFR African American  118 mL/min/1.73m2 (> OR = 60 - )   2/5/2019 9:25 AM Calcium Level  9.0 mg/dL (8.6 - 10.3)   2/5/2019 9:25 AM Bilirubin Total  0.6 mg/dL (0.2 - 1.2)   2/5/2019 9:25 AM Alkaline Phosphatase  77 unit/L (40 - 115)   2/5/2019 9:25 AM AST/SGOT  22 unit/L (10 - 35)   2/5/2019 9:25 AM ALT/SGPT  26 unit/L (9 - 46)   2/5/2019 9:25 AM Protein Total  7.2 gm/dL (6.1 - 8.1)   2/5/2019 9:25 AM Albumin Level  4.1 gm/dL (3.6 - 5.1)   2/5/2019 9:25 AM Globulin  3.1 (1.9 - 3.7)   2/5/2019 9:25 AM A/G Ratio  1.3 (1.0 - 2.5)   2/5/2019 9:25 AM Cholesterol  150 mg/dL ( - <200)   2/5/2019 9:25 AM Non-HDL Cholesterol  111 ( - <130)   2/5/2019 9:25 AM HDL ((L)) 39 mg/dL (>40 - )   2/5/2019 9:25 AM Cholesterol/HDL Ratio  3.8 ( - <5.0)   2/5/2019 9:25 AM LDL  67    2/5/2019 9:25 AM Triglyceride ((H)) 368 mg/dL ( - <150)   2/5/2019 9:25 AM PSA  0.6 ng/mL ( - < OR = 4.0)   2/5/2019 9:25 AM WBC  4.7 (3.8 - 10.8)   2/5/2019 9:25 AM RBC  4.77 (4.20 - 5.80)   2/5/2019 9:25 AM Hgb  13.9 gm/dL (13.2 - 17.1)   2/5/2019 9:25 AM Hct  40.2 % (38.5 - 50.0)   2/5/2019 9:25 AM MCV  84.3 fL (80.0 - 100.0)   2/5/2019 9:25 AM MCH  29.1 pg (27.0 - 33.0)   2/5/2019 9:25 AM MCHC  34.6  gm/dL (32.0 - 36.0)   2/5/2019 9:25 AM RDW  13.7 % (11.0 - 15.0)   2/5/2019 9:25 AM Platelet  157 (140 - 400)   2/5/2019 9:25 AM MPV  11.4 fL (7.5 - 12.5)

## 2022-02-15 NOTE — NURSING NOTE
Comprehensive Intake Entered On:  1/28/2020 1:00 PM CST    Performed On:  1/28/2020 12:58 PM CST by Sarah Cervantes CMA               Summary   Chief Complaint :   Pt here for work. comp injury... twisted left knee...DOI 1/28/20   Sarah Cervantes CMA - 1/29/2020 2:15 PM CST     Weight Measured :   309 lb(Converted to: 309 lb 0 oz, 140.16 kg)    Height Measured :   71 in(Converted to: 5 ft 11 in, 180.34 cm)    Body Mass Index :   43.09 kg/m2 (HI)    Body Surface Area :   2.65 m2   Systolic Blood Pressure :   136 mmHg (HI)    Diastolic Blood Pressure :   70 mmHg   Mean Arterial Pressure :   92 mmHg   Peripheral Pulse Rate :   71 bpm   Oxygen Saturation :   98 %   Race :      Languages :   English   Ethnicity :   Not  or    Sarah Cervantes CMA - 1/28/2020 12:58 PM CST   Health Status   Allergies Verified? :   Yes   Medication History Verified? :   Yes   Immunizations Current :   Yes   Medical History Verified? :   Yes   Pre-Visit Planning Status :   N/A   Tobacco Use? :   Never smoker   Sarah Cervantes CMA - 1/28/2020 12:58 PM CST   Consents   Consent for Immunization Exchange :   Consent Granted   Consent for Immunizations to Providers :   Consent Granted   Sarah Cervantes CMA - 1/28/2020 12:58 PM CST   Meds / Allergies   (As Of: 1/28/2020 1:00:33 PM CST)   Allergies (Active)   No known allergies  Estimated Onset Date:   Unspecified ; Created By:   Myriam Graves; Reaction Status:   Active ; Category:   Drug ; Substance:   No known allergies ; Type:   Allergy ; Updated By:   Myriam Graves; Reviewed Date:   1/28/2020 12:59 PM CST        Medication List   (As Of: 1/28/2020 1:00:33 PM CST)   Prescription/Discharge Order    atorvastatin  :   atorvastatin ; Status:   Prescribed ; Ordered As Mnemonic:   atorvastatin 20 mg oral tablet ; Simple Display Line:   20 mg, 1 tab(s), po, daily, 90 tab(s), 1 Refill(s) ; Ordering Provider:   Castillo Nogueira MD; Catalog Code:   atorvastatin ; Order Dt/Tm:    9/16/2019 9:21:04 AM CDT          gemfibrozil  :   gemfibrozil ; Status:   Prescribed ; Ordered As Mnemonic:   gemfibrozil 600 mg oral tablet ; Simple Display Line:   600 mg, 1 tab(s), Oral, bid, 180 tab(s), 1 Refill(s) ; Ordering Provider:   Castillo Nogueira MD; Catalog Code:   gemfibrozil ; Order Dt/Tm:   9/17/2019 12:15:18 PM CDT          losartan  :   losartan ; Status:   Prescribed ; Ordered As Mnemonic:   losartan 50 mg oral tablet ; Simple Display Line:   1 tab(s), Oral, daily, 90 tab(s), 1 Refill(s) ; Ordering Provider:   Castillo Nogueira MD; Catalog Code:   losartan ; Order Dt/Tm:   9/16/2019 1:47:29 PM CDT          naproxen  :   naproxen ; Status:   Prescribed ; Ordered As Mnemonic:   naproxen sodium 220 mg oral tablet ; Simple Display Line:   440 mg, 2 tab(s), PO, q8hr, PRN: for pain, 60 tab(s), 0 Refill(s) ; Ordering Provider:   Castillo Nogueira MD; Catalog Code:   naproxen ; Order Dt/Tm:   5/10/2017 3:45:25 PM CDT          sildenafil  :   sildenafil ; Status:   Prescribed ; Ordered As Mnemonic:   sildenafil 20 mg oral tablet ; Simple Display Line:   40 mg, 2 tab(s), Oral, prn, as needed for planned sexual activity, PRN: Other (see comment), 20 tab(s), 5 Refill(s) ; Ordering Provider:   Castillo Nogueira MD; Catalog Code:   sildenafil ; Order Dt/Tm:   9/24/2019 2:56:03 PM CDT

## 2022-02-15 NOTE — NURSING NOTE
Comprehensive Intake Entered On:  8/17/2020 3:43 PM CDT    Performed On:  8/17/2020 3:40 PM CDT by Sarah Cervantes CMA               Summary   Chief Complaint :   Pt here for cellulitis   Weight Measured :   301 lb(Converted to: 301 lb 0 oz, 136.53 kg)    Height Measured :   71 in(Converted to: 5 ft 11 in, 180.34 cm)    Body Mass Index :   41.98 kg/m2 (HI)    Body Surface Area :   2.61 m2   Height/Length Estimated :   71 in(Converted to: 5 ft 11 in, 180.34 cm)    Systolic Blood Pressure :   130 mmHg   Diastolic Blood Pressure :   66 mmHg   Mean Arterial Pressure :   87 mmHg   Peripheral Pulse Rate :   87 bpm   Temperature Tympanic :   97.5 DegF(Converted to: 36.4 DegC)  (LOW)    Oxygen Saturation :   98 %   Race :      Languages :   English   Ethnicity :   Not  or    Sarah Cervantes CMA - 8/17/2020 3:40 PM CDT   Health Status   Allergies Verified? :   Yes   Medication History Verified? :   Yes   Immunizations Current :   Yes   Medical History Verified? :   Yes   Tobacco Use? :   Former smoker   Sarah Cervantes CMA - 8/17/2020 3:40 PM CDT   Consents   Consent for Immunization Exchange :   Consent Granted   Consent for Immunizations to Providers :   Consent Granted   Sarah Cervantes CMA 8/17/2020 3:40 PM CDT   Meds / Allergies   (As Of: 8/17/2020 3:43:04 PM CDT)   Allergies (Active)   No Known Medication Allergies  Estimated Onset Date:   Unspecified ; Created By:   Elise Lubin LPN; Reaction Status:   Active ; Category:   Drug ; Substance:   No Known Medication Allergies ; Type:   Allergy ; Updated By:   Elise Lubin LPN; Reviewed Date:   8/17/2020 3:42 PM CDT        Medication List   (As Of: 8/17/2020 3:43:04 PM CDT)   Prescription/Discharge Order    atorvastatin  :   atorvastatin ; Status:   Prescribed ; Ordered As Mnemonic:   atorvastatin 20 mg oral tablet ; Simple Display Line:   20 mg, 1 tab(s), Oral, daily, 90 tab(s), 1 Refill(s) ; Ordering Provider:   Castillo Nogueira MD;  Catalog Code:   atorvastatin ; Order Dt/Tm:   3/2/2020 1:06:42 PM CST          gemfibrozil  :   gemfibrozil ; Status:   Prescribed ; Ordered As Mnemonic:   gemfibrozil 600 mg oral tablet ; Simple Display Line:   See Instructions, TAKE ONE (1) TABLET BY MOUTH TWO (2) TIMES, 180 tab(s), 0 Refill(s) ; Ordering Provider:   Castillo Nogueira MD; Catalog Code:   gemfibrozil ; Order Dt/Tm:   8/6/2020 1:17:38 PM CDT          losartan  :   losartan ; Status:   Prescribed ; Ordered As Mnemonic:   losartan 50 mg oral tablet ; Simple Display Line:   50 mg, 1 tab(s), Oral, daily, 90 tab(s), 1 Refill(s) ; Ordering Provider:   Castillo Nogueira MD; Catalog Code:   losartan ; Order Dt/Tm:   3/2/2020 1:07:04 PM CST          sildenafil  :   sildenafil ; Status:   Prescribed ; Ordered As Mnemonic:   sildenafil 20 mg oral tablet ; Simple Display Line:   40 mg, 2 tab(s), Oral, prn, as needed for planned sexual activity, PRN: Other (see comment), 20 tab(s), 5 Refill(s) ; Ordering Provider:   Castillo Nogueira MD; Catalog Code:   sildenafil ; Order Dt/Tm:   9/24/2019 2:56:03 PM CDT            ID Risk Screen   Recent Travel History :   No recent travel   Family Member Travel History :   No recent travel   Other Exposure to Infectious Disease :   Unknown   Sarah Cervantes CMA - 8/17/2020 3:40 PM CDT

## 2022-02-15 NOTE — TELEPHONE ENCOUNTER
Entered by Sarah Cervantes CMA on October 06, 2020 10:16:46 AM CDT  ---------------------  From: Sarah Cervantes CMA   To: [facility]    Sent: 10/6/2020 10:16:46 AM CDT  Subject: Medication Management     ** Submitted: **  Order:atorvastatin (atorvastatin 20 mg oral tablet)  1 tab(s)  Oral  daily  Qty:  90 tab(s)        Refills:  1          Substitutions Allowed     Route To [facility]    Signed by Sarah Cervantes CMA  10/6/2020 3:16:00 PM [facility]    ** Submitted: **  Complete:atorvastatin (atorvastatin 20 mg oral tablet)   Signed by Sarah Cervantes CMA  10/6/2020 3:16:00 PM [facility]    ** Not Approved:  **  atorvastatin (ATORVASTATIN 20MG)  TAKE ONE (1) TABLET EACH NIGHT AT BEDTIME FOR CHOLESTEROL  Qty:  90 tab(s)        Days Supply:  30        Refills:  0          Substitutions Allowed     Route To [facility]   Note from Pharmacy:  * * N O T I C E * * Last quantity doesn't match original quantity  Signed by Sarah Cervantes CMA            ** Patient matched by Sarah Cervantes CMA on 10/6/2020 10:16:14 AM CDT **      ------------------------------------------  From: [facility]  To: Castillo Nogueira MD  Sent: October 5, 2020 12:20:02 PM CDT  Subject: Medication Management  Due: October 2, 2020 11:09:35 PM CDT     ** On Hold Pending Signature **     Drug: atorvastatin (atorvastatin 20 mg oral tablet), TAKE ONE (1) TABLET EACH NIGHT AT BEDTIME FOR CHOLESTEROL  Quantity: 90 tab(s)  Days Supply: 30  Refills: 0  Substitutions Allowed  Notes from Pharmacy:     Dispensed Drug: atorvastatin (atorvastatin 20 mg oral tablet), TAKE ONE (1) TABLET EACH NIGHT AT BEDTIME FOR CHOLESTEROL  Quantity: 90 tab(s)  Days Supply: 30  Refills: 0  Substitutions Allowed  Notes from Pharmacy: * * N O T I C E * * Last quantity doesn t match original quantity  ------------------------------------------

## 2022-02-15 NOTE — NURSING NOTE
Comprehensive Intake Entered On:  6/24/2020 10:40 AM CDT    Performed On:  6/24/2020 10:35 AM CDT by Elise Lubin LPN               Summary   Chief Complaint :   right wrist pain, fell about 6 weeks ago, continues to not be getting better   Weight Measured :   301.6 lb(Converted to: 301 lb 10 oz, 136.80 kg)    Height Measured :   71 in(Converted to: 5 ft 11 in, 180.34 cm)    Body Mass Index :   42.06 kg/m2 (HI)    Body Surface Area :   2.62 m2   Height/Length Estimated :   71 in(Converted to: 5 ft 11 in, 180.34 cm)    Systolic Blood Pressure :   122 mmHg   Diastolic Blood Pressure :   78 mmHg   Mean Arterial Pressure :   93 mmHg   Peripheral Pulse Rate :   67 bpm   BP Site :   Right arm   BP Method :   Manual   Temperature Tympanic :   97.4 DegF(Converted to: 36.3 DegC)  (LOW)    Race :      Languages :   English   Ethnicity :   Not  or    Elise Lubin LPN - 6/24/2020 10:35 AM CDT   Health Status   Allergies Verified? :   Yes   Medication History Verified? :   Yes   Immunizations Current :   Yes   Medical History Verified? :   No   Pre-Visit Planning Status :   Not completed   Tobacco Use? :   Former smoker   Elise Lubin LPN - 6/24/2020 10:35 AM CDT   Meds / Allergies   (As Of: 6/24/2020 10:40:21 AM CDT)   Allergies (Active)   No Known Medication Allergies  Estimated Onset Date:   Unspecified ; Created By:   Elise Lubin LPN; Reaction Status:   Active ; Category:   Drug ; Substance:   No Known Medication Allergies ; Type:   Allergy ; Updated By:   Elise Lubin LPN; Reviewed Date:   6/24/2020 10:37 AM CDT        Medication List   (As Of: 6/24/2020 10:40:21 AM CDT)   Prescription/Discharge Order    atorvastatin  :   atorvastatin ; Status:   Prescribed ; Ordered As Mnemonic:   atorvastatin 20 mg oral tablet ; Simple Display Line:   20 mg, 1 tab(s), Oral, daily, 90 tab(s), 1 Refill(s) ; Ordering Provider:   Castillo Nogueira MD; Catalog Code:   atorvastatin ; Order  Dt/Tm:   3/2/2020 1:06:42 PM CST          losartan  :   losartan ; Status:   Prescribed ; Ordered As Mnemonic:   losartan 50 mg oral tablet ; Simple Display Line:   50 mg, 1 tab(s), Oral, daily, 90 tab(s), 1 Refill(s) ; Ordering Provider:   Castillo Nogueira MD; Catalog Code:   losartan ; Order Dt/Tm:   3/2/2020 1:07:04 PM CST          gemfibrozil  :   gemfibrozil ; Status:   Prescribed ; Ordered As Mnemonic:   gemfibrozil 600 mg oral tablet ; Simple Display Line:   600 mg, 1 tab(s), Oral, bid, 180 tab(s), 0 Refill(s) ; Ordering Provider:   Castillo Nogueira MD; Catalog Code:   gemfibrozil ; Order Dt/Tm:   4/6/2020 8:14:44 AM CDT          sildenafil  :   sildenafil ; Status:   Prescribed ; Ordered As Mnemonic:   sildenafil 20 mg oral tablet ; Simple Display Line:   40 mg, 2 tab(s), Oral, prn, as needed for planned sexual activity, PRN: Other (see comment), 20 tab(s), 5 Refill(s) ; Ordering Provider:   Castillo Nogueira MD; Catalog Code:   sildenafil ; Order Dt/Tm:   9/24/2019 2:56:03 PM CDT            ID Risk Screen   Recent Travel History :   No recent travel   Family Member Travel History :   No recent travel   Other Exposure to Infectious Disease :   Unknown   Elise Lubin LPN - 6/24/2020 10:35 AM CDT

## 2022-02-15 NOTE — PROGRESS NOTES
Patient:   YAZMIN GRANADOS            MRN: [id number]            FIN: [id number]               Age:   56 years     Sex:  Male     :  1960   Associated Diagnoses:   Changing skin lesion   Author:   Castillo Nogueira MD      Impression and Plan   Diagnosis     Changing skin lesion ([id number] L98.9).     Orders      (Selected)   Outpatient Orders  Ordered  Surgical Pathology (Request): Changing skin lesion  Order  [id number] shaving skin les 1 trunk/arm/leg diam 0.5cm/less than (Charge): Quantity: 1, Changing skin lesion.     Counseled:  Patient, Regarding diagnosis, Regarding treatment.       Visit Information      Date of Service: 2017 08:52 am  Performing Location: [facility]  Encounter#: [id number]      Primary Care Provider (PCP):  Castillo Nogueira MD    NPI# [id number]   Visit type:  New symptom.    Accompanied by:  No one.    Source of history:  Self.    History limitation:  None.       Procedure   Biopsy procedure   Date/ Time:  2017 9:31:00 AM.     Confirmed: patient, procedure, side, site, safety procedures followed.     Performed by: Castillo Nogueira MD.     Informed consent: signed by patient.     Indication: abnormal physical findings, suspected malignancy.     Preparation and technique: skin prepped (in usual sterile fashion, with alcohol), sterile preparation of site, local anesthesia, technique (shave biopsy, number 10 scalpel), hemostasis achieved using electrocautery.     Site #  1: left, scalp, size and depth (length  0.5  cm, width  0.5  cm, superficial), specimen sent to pathology in preservative.     Completion: 1  lesions biopsied, estimated blood loss minimal.     Procedure tolerated: well.     wound care instructions given.

## 2022-02-15 NOTE — PROGRESS NOTES
Patient:   YAZMIN GRANADOS            MRN: [id number]            FIN: [id number]               Age:   58 years     Sex:  Male     :  1960   Associated Diagnoses:   Epistaxis   Author:   Castillo Nogueira MD      Impression and Plan   Diagnosis     Epistaxis (YSQ80-MN R04.0).     Course:  Improving.    Plan:  Rhino rocket removed without complication.    Orders     Orders   Charges (Evaluation and Management):  25756 office outpatient visit 15 minutes (Charge) (Order): Quantity: 1, Epistaxis.        Visit Information   Visit type:  Scheduled follow-up.    Accompanied by:  No one.    Source of history:  Self.    History limitation:  None.       Chief Complaint   Chief complaint discussed and confirmed correct.         10/16/2018 10:06 AM CDT Pt here for f/u on nosebleed   10/15/2018 10:09 AM CDT Pt here for bloody nose that started this morning         History of Present Illness             The patient presents with removal of Rhinorocket.  There are no modifying factors.  Associated symptoms consist of none.        Review of Systems   Constitutional:  Negative.    Eye:  Negative.    Ear/Nose/Mouth/Throat:  Negative except as documented in history of present illness.    Respiratory:  Negative.    Cardiovascular:  Negative.    Gastrointestinal:  Negative.    Genitourinary:  Negative.    Hematology/Lymphatics:  Negative.    Endocrine:  Negative.    Immunologic:  Negative.    Musculoskeletal:  Negative.    Integumentary:  Negative.    Neurologic:  Negative.    Psychiatric:  Negative.    All other systems reviewed and negative      Health Status   Allergies:    Allergic Reactions (Selected)  No known allergies   Medications:  (Selected)   Prescriptions  Prescribed  atorvastatin 20 mg oral tablet: 1 tab(s) ( 20 mg ), po, daily, # 90 tab(s), 3 Refill(s), Type: Maintenance, Pharmacy: [facility], 1 tab(s) po daily  losartan 50 mg oral tablet: = 1 tab(s) ( 50 mg ), PO, Daily, # 90 tab(s), 1 Refill(s), Type: Maintenance,  Pharmacy: [facility], 1 tab(s) Oral daily  naproxen sodium 220 mg oral tablet: 2 tab(s) ( 440 mg ), PO, q8hr, PRN: for pain, # 60 tab(s), 0 Refill(s), Type: Maintenance, OTC (Rx)  Documented Medications  Documented  aspirin 81 mg oral delayed release tablet: 1 tab(s) ( 81 mg ), po, daily, 0 Refill(s), Type: Maintenance   Problem list:    All Problems  Anal Fissure / ICD-9-.0 / Confirmed  Benign essential HTN / SNOMED CT 7479863 / Confirmed  CAD (coronary artery disease) / SNOMED CT 0134465193 / Confirmed  Cervical radiculopathy at C6 / SNOMED CT 772468590 / Confirmed  Erectile Dysfunction / SNOMED CT 535598166 / Confirmed  Hypertriglyceridemia / ICD-9-.1 / Confirmed  Morbid obesity / SNOMED CT 255463766 / Confirmed  Obese / ICD-9-.00 / Probable  Osteoarthritis / ICD-9-.90 / Confirmed  Inactive: GERD (Gastroesophageal Reflux Disease) / ICD-9-.81  Resolved: *Hospitalized@[facility] - Cellulitis LLE  Resolved: Arthroscopy of knee / SNOMED CT 568754357  Resolved: Arthroscopy of shoulder / SNOMED CT 201442288  Resolved: Hip replacement / SNOMED CT 5571806844  Resolved: Repair of diaphragmatic hiatal hernia / SNOMED CT 436350734  Resolved: Repair of rotator cuff of shoulder / SNOMED CT 6290107827  Resolved: Resection of clavicle / SNOMED CT 75238022  Canceled: Hyperlipemia / ICD-9-.4      Histories   Past Medical History:    Resolved  *Hospitalized@[facility] - Cellulitis LLE: Onset on 2/5/2012 at 52 years.  Resolved.  Repair of diaphragmatic hiatal hernia (512321015):  Resolved in 1992 at 31 years.  Comments:  6/28/2010 CDT 4:26 PM CDT - Danae Cruz Fundaplication  Arthroscopy of knee (639412695):  Resolved in 1990 at 29 years.  Comments:  6/28/2010 CDT 4:28 PM CDT - Danae Cruz  Right knee  Resection of clavicle (97153183):  Resolved in 1996 at 35 years.  Comments:  6/28/2010 CDT 4:33 PM CDT - Danae Cruz  Left-distal  Arthroscopy of shoulder (586352866):  Resolved in 1999 at  38 years.  Comments:  2010 CDT 4:34 PM CDT - Danae Cruz  right  Repair of rotator cuff of shoulder (1476135159):  Resolved in  at 44 years.  Comments:  2010 CDT 4:37 PM CDT - Danae Cruz  left  Hip replacement (4196682667):  Resolved in  at 46 years.  Comments:  2010 CDT 4:38 PM CDT - Danae Cruz  right   Family History:    Cancer  Father  Diabetes mellitus  Mother ()  Hypertension  Father  Heart disease  Father  Heart attack  Mother ()     Procedure history:    Rotator cuff repair (SNOMED CT 775703669) performed by Jacques Paris MD on 10/17/2016 at 56 Years.  Comments:  10/27/2016 8:27 AM - [place] Holman.    10/6/2016 9:16 AM - Castillo Nogueira MD, Dr.  [facility]  10/17/2016   Right C5-7 Cervical foraminotomy in  at 54 Years.  Comments:  1/15/2015 10:29 AM - Castillo Nogueira MD, Dr. [facility]  Right Hip Arthroscopy and Psoas Tenotomy in  at 53 Years.  Colonoscopy (SNOMED CT 049796139) in  at 52 Years.  Arthroscopy of Shoulder in  at 50 Years.  Comments:  2010 3:19 PM - Castillo Nogueira MD, Dr.  C6-7 Laminectomy in  at 50 Years.  Comments:  2010 3:20 PM - Castillo Nogueira MD, Dr. Urbano  [facility]  hip replacement in  at 47 Years.  Comments:  2010 4:56 PM - Danae Cruz  Right  Repair of rotator cuff of shoulder (SNOMED CT 4131796644) in  at 45 Years.  Comments:  2010 4:58 PM - Cruz Danae  left  Arthroscopy of shoulder (SNOMED CT 592092484) in  at 39 Years.  Comments:  2010 4:55 PM - Danae Cruz  Right  Resection of clavicle (SNOMED CT 67673000) in  at 36 Years.  Comments:  2010 5:00 PM - Danae Cruz  left distal  Repair of diaphragmatic hiatal hernia (SNOMED CT 414839760) in  at 32 Years.  Comments:  2010 4:58 PM - Danae Cruz Fundaplication  Arthroscopy of knee (SNOMED CT 483144304) in  at 30 Years.  Comments:  2010 4:54 PM  - Danae Cruz  Right      Physical Examination   Vital signs reviewed  and within acceptable limits    Vital Signs   10/16/2018 10:06 AM CDT Peripheral Pulse Rate 79 bpm    Systolic Blood Pressure 122 mmHg    Diastolic Blood Pressure 74 mmHg    Mean Arterial Pressure 90 mmHg    Oxygen Saturation 96 %   10/15/2018 10:09 AM CDT Vital Signs Comments Unable to do vitals as pt is needing to stand over sink due to blood      Measurements from flowsheet : Measurements   10/16/2018 10:06 AM CDT Height Measured - Standard 71 in    Weight Measured - Standard 302 lb    BSA 2.62 m2    Body Mass Index 42.12 kg/m2  HI   10/15/2018 10:09 AM CDT Ht/Wt Measurement Refused by Patient? Yes      General:  No acute distress.    Eye:  Pupils are equal, round and reactive to light, Extraocular movements are intact, Normal conjunctiva.    HENT:  Normocephalic, Normal hearing, Oral mucosa is moist, rhino rocket easily removed without complication - epistaxis resolved.    Neck:  Supple, Non-tender, No lymphadenopathy, No thyromegaly.    Respiratory:  Lungs are clear to auscultation, Respirations are non-labored.    Cardiovascular:  Normal rate, Regular rhythm, No edema.    Integumentary:  Warm, Dry, Pink.    Neurologic:  Alert, Oriented.    Psychiatric:  Cooperative, Appropriate mood & affect, Normal judgment.

## 2022-02-15 NOTE — NURSING NOTE
VM received from Xenia at [facility] at 1543; pt has MRI for right shoulder scheduled there and they need the order faxed; faxed order to [phone]; confirmed sent.

## 2022-02-15 NOTE — NURSING NOTE
Pre op exam faxed     TO:  [facility]    FAX: _    DOS: 11/16/20    DR: Alejandrian    PROC: left knee replacement

## 2022-02-15 NOTE — PROGRESS NOTES
Patient:   YAZMIN GRANADOS            MRN: [id number]            FIN: [id number]               Age:   60 years     Sex:  Male     :  1960   Associated Diagnoses:   Right wrist pain   Author:   Castillo Virgen PA-C      Chief Complaint   2020 10:35 AM CDT   right wrist pain, fell about 6 weeks ago, continues to not be getting better      History of Present Illness   Chief complaint and symptoms noted above and confirmed with patient   injured right wrist about 6 weeks (fell off a ladder and landed on right wrist),  still not improving  there was never swelling           Review of Systems   Constitutional:  Negative.    Ear/Nose/Mouth/Throat:  Negative.    Respiratory:  Negative.       Health Status   Allergies:    Allergic Reactions (Selected)  No Known Medication Allergies   Medications:  (Selected)   Prescriptions  Prescribed  atorvastatin 20 mg oral tablet: = 1 tab(s) ( 20 mg ), Oral, daily, # 90 tab(s), 1 Refill(s), Type: Maintenance, Pharmacy: [facility]  gemfibrozil 600 mg oral tablet: = 1 tab(s) ( 600 mg ), Oral, bid, # 180 tab(s), 0 Refill(s), Type: Maintenance, Pharmacy: [facility]  losartan 50 mg oral tablet: = 1 tab(s) ( 50 mg ), Oral, daily, # 90 tab(s), 1 Refill(s), Type: Maintenance, Pharmacy: [facility]  sildenafil 20 mg oral tablet: = 2 tab(s) ( 40 mg ), Oral, prn, Instructions: as needed for planned sexual activity, PRN: Other (see comment), # 20 tab(s), 5 Refill(s), Type: Maintenance, Pharmacy: [facility], 2 tab(s) Oral prn,PRN:Other (see comment),Instr:as needed for pl...   Problem list:    All Problems (Selected)  Cervical radiculopathy at C6 / SNOMED CT 881206440 / Confirmed  Hypertriglyceridemia / ICD-9-.1 / Confirmed  Obese / ICD-9-.00 / Probable  CAD (coronary artery disease) / SNOMED CT 0854761465 / Confirmed  Erectile Dysfunction / SNOMED CT 288756039 / Confirmed  Benign essential HTN / SNOMED CT 1776966 / Confirmed  Morbid obesity / SNOMED  CT 980875004 / Confirmed  Anal Fissure / ICD-9-.0 / Confirmed  Osteoarthritis / ICD-9-.90 / Confirmed      Histories   Past Medical History:    Resolved  *Hospitalized@[facility] - Cellulitis LLE: Onset on 2012 at 52 years.  Resolved.  Repair of diaphragmatic hiatal hernia (907251520):  Resolved in  at 31 years.  Comments:  2010 CDT 4:26 PM CDT - Danae Cruz  Francis Fundaplication  Arthroscopy of knee (192925366):  Resolved in  at 29 years.  Comments:  2010 CDT 4:28 PM CDT Danae Real  Right knee  Resection of clavicle (33179608):  Resolved in  at 35 years.  Comments:  2010 CDT 4:33 PM CDT Danae Real  Left-distal  Arthroscopy of shoulder (085017226):  Resolved in  at 38 years.  Comments:  2010 CDT 4:34 PM Danae Mccall  right  Repair of rotator cuff of shoulder (2524299501):  Resolved in  at 44 years.  Comments:  2010 CDT 4:37 PM Danae Mccall  left  Hip replacement (6955282899):  Resolved in  at 46 years.  Comments:  2010 CDT 4:38 PM Danae Mccall     Family History:    Cancer  Father  Diabetes mellitus  Mother ()  Hypertension  Father  Heart disease  Father  Heart attack  Mother ()     Procedure history:    Rotator cuff repair (965979267) on 10/17/2016 at 56 Years.  Comments:  10/27/2016 8:27 AM DANIELLE Holman [place]  Left.    10/6/2016 9:16 AM Castillo Kapoor MD, Dr.  [facility]  10/17/2016   Right C5-7 Cervical foraminotomy in  at 54 Years.  Comments:  1/15/2015 10:29 AM Castillo Garg MD, Dr. [facility]  Right Hip Arthroscopy and Psoas Tenotomy in  at 53 Years.  Colonoscopy (825029509) in  at 52 Years.  Arthroscopy of Shoulder in  at 50 Years.  Comments:  2010 3:19 PM Castillo Kapoor MD, Dr. Schnieder  C6-7 Laminectomy in  at 50 Years.  Comments:  2010 3:20 PM DANIELLE Nogueira MD, Castillo  left   [facility]  hip replacement  in 2007 at 47 Years.  Comments:  6/28/2010 4:56 PM DANIELLE Cruz Sunnyis  Right  Repair of rotator cuff of shoulder (4485675935) in 2005 at 45 Years.  Comments:  6/28/2010 4:58 PM DANIELLE WayDanae kendall  left  Arthroscopy of shoulder (291333749) in 1999 at 39 Years.  Comments:  6/28/2010 4:55 PM CDT Jefe Cruz Danae  Right  Resection of clavicle (63827162) in 1996 at 36 Years.  Comments:  6/28/2010 5:00 PM SRIT Jefe Cruz Danae  left distal  Repair of diaphragmatic hiatal hernia (629226688) in 1992 at 32 Years.  Comments:  6/28/2010 4:58 PM DANIELLE Cruz Danae  Francis Fundaplication  Arthroscopy of knee (787241308) in 1990 at 30 Years.  Comments:  6/28/2010 4:54 PM DANIELLE Jefe Nancy Danae      Physical Examination   Vital Signs   6/24/2020 10:35 AM CDT Temperature Tympanic 97.4 DegF  LOW    Peripheral Pulse Rate 67 bpm    Systolic Blood Pressure 122 mmHg    Diastolic Blood Pressure 78 mmHg    Mean Arterial Pressure 93 mmHg    BP Site Right arm    BP Method Manual      Measurements from flowsheet : Measurements   6/24/2020 10:35 AM CDT Height Measured - Standard 71 in    Height/Length Estimated 71 in    Weight Measured - Standard 301.6 lb    BSA 2.62 m2    Body Mass Index 42.06 kg/m2  HI      General:  No acute distress.    Musculoskeletal:  no swelling or deformation of right hand, good capillary refill in fingertips, good sensation, good  strength, good resisited suppination and pronation but pain with resisted pronation,  good flexion and extension, no snuffbox tenderness, tenderness over ulnar styloid, .       Review / Management   Radiology results   Results reviewed with patient.  Xray shows no fractures or dislocations per my read.  Will be over-read by radiologist.  Will call if over-read has additional information.      Impression and Plan   Diagnosis     Right wrist pain (TIK55-WU M25.531).     Summary:  right wrist sprain, probable ongoing tendonitis,  offered referral to orthopedic hand specialist but he  declined at this time  he will discuss this with his primary, Dr Nogueira.    Orders     Orders   Requests (Radiology):  XR Wrist Complete w/ Navicular Right (Request) (Order): Instructions: pain over ulnar styloid, Right wrist pain.     Orders   Charges (Evaluation and Management):  14525 office outpatient visit 15 minutes (Charge) (Order): Quantity: 1, Right wrist pain.

## 2022-02-15 NOTE — PROGRESS NOTES
Patient:   YAZMIN GRANADOS            MRN: [id number]            FIN: [id number]               Age:   58 years     Sex:  Male     :  1960   Associated Diagnoses:   Well adult exam   Author:   Castillo Nogueira MD      Impression and Plan   Diagnosis     Well adult exam (YDX97-PC Z00.00).     Course:  Progressing as expected.    Plan   Orders     Orders   Charges (Evaluation and Management):  91796 periodic preventive med est patient 40-64yrs (Charge) (Order): Quantity: 1, Well adult exam.     Orders (Selected)   Outpatient Orders  Ordered  Referral (Request): 18 8:50:00 CDT, Referred to: General Surgery, Referred to: Bariatric [facility] [place], Morbid obesity  Ordered (Dispatched)  CBC (h/h, RBC, indices, WBC, Plt)* ([facility]): Specimen Type: Blood, Collection Date: 18 8:51:00 CDT  Comprehensive Metabolic Panel* ([facility]): Specimen Type: Serum, Collection Date: 18 8:51:00 CDT  Lipid panel with reflex to direct ldl* ([facility]): Specimen Type: Serum, Collection Date: 18 8:51:00 CDT  PSA, Total (Room)* ([facility]): Specimen Type: Serum, Collection Date: 18 8:51:00 CDT.        Visit Information      Date of Service: 2018 08:19 am  Performing Location: [facility]  Encounter#: [id number]      Primary Care Provider (PCP):  Castillo Nogueira MD    NPI# [id number]      Referring Provider:  Castillo Nogueira MD, NPI# [id number]   Visit type:  Annual exam.    Accompanied by:  No one.    Source of history:  Self.    History limitation:  None.       Chief Complaint   Chief complaint discussed and confirmed correct.     3/20/2018 8:25 AM CDT    Pt here for px        Well Adult History   Well Adult History             The patient presents for well adult exam.  The patient's general health status is described as good.  The patient's diet is described as balanced.  Exercise: routine.  Associated symptoms consist of none.  Medical encounters: none.  Compliance problems: none.        Review of Systems    Constitutional:  Negative.    Eye:  Negative.    Ear/Nose/Mouth/Throat:  Negative.    Respiratory:  Negative.    Cardiovascular:  Negative.    Gastrointestinal:  Negative.    Genitourinary:  Negative.    Hematology/Lymphatics:  Negative.    Endocrine:  Negative.    Immunologic:  Negative.    Musculoskeletal:  Negative.    Integumentary:  Negative.    Neurologic:  Negative.    Psychiatric:  Negative.    All other systems reviewed and negative      Health Status   Allergies:    Allergic Reactions (Selected)  No known allergies   Medications:  (Selected)   Prescriptions  Prescribed  atorvastatin 20 mg oral tablet: 1 tab(s) ( 20 mg ), po, daily, # 90 tab(s), 3 Refill(s), Type: Maintenance, Pharmacy: [facility], 1 tab(s) po daily  naproxen sodium 220 mg oral tablet: 2 tab(s) ( 440 mg ), PO, q8hr, PRN: for pain, # 60 tab(s), 0 Refill(s), Type: Maintenance, OTC (Rx)  Documented Medications  Documented  aspirin 81 mg oral delayed release tablet: 1 tab(s) ( 81 mg ), po, daily, 0 Refill(s), Type: Maintenance   Problem list:    All Problems  Anal Fissure / ICD-9-.0 / Confirmed  CAD (coronary artery disease) / SNOMED CT 0089972401 / Confirmed  Cervical radiculopathy at C6 / SNOMED CT 152722199 / Confirmed  Erectile Dysfunction / SNOMED CT 387332942 / Confirmed  Hypertriglyceridemia / ICD-9-.1 / Confirmed  Obese / ICD-9-.00 / Probable  Osteoarthritis / ICD-9-.90 / Confirmed  Inactive: GERD (Gastroesophageal Reflux Disease) / ICD-9-.81  Resolved: *Hospitalized@[facility] - Cellulitis LLE  Resolved: Arthroscopy of knee / SNOMED CT 273873714  Resolved: Arthroscopy of shoulder / SNOMED CT 260030613  Resolved: Hip replacement / SNOMED CT 2465695802  Resolved: Repair of diaphragmatic hiatal hernia / SNOMED CT 837058721  Resolved: Repair of rotator cuff of shoulder / SNOMED CT 7510890390  Resolved: Resection of clavicle / SNOMED CT 45135083  Canceled: Hyperlipemia / ICD-9-.4      Histories   Past  Medical History:    Resolved  *Hospitalized@[facility] - Cellulitis LLE: Onset on 2012 at 52 years.  Resolved.  Repair of diaphragmatic hiatal hernia (558196033):  Resolved in  at 31 years.  Comments:  2010 CDT 4:26 PM CDT - Nancy Sunnyis  Francis Fundaplication  Arthroscopy of knee (639979908):  Resolved in  at 29 years.  Comments:  2010 CDT 4:28 PM CDT - Nancy Danae  Right knee  Resection of clavicle (35985617):  Resolved in  at 35 years.  Comments:  2010 CDT 4:33 PM CDT - Nancy Danae  Left-distal  Arthroscopy of shoulder (653947977):  Resolved in  at 38 years.  Comments:  2010 CDT 4:34 PM CDT Danae Real  right  Repair of rotator cuff of shoulder (4000738041):  Resolved in  at 44 years.  Comments:  2010 CDT 4:37 PM CDT Danae Real  left  Hip replacement (3308753899):  Resolved in  at 46 years.  Comments:  2010 CDT 4:38 PM CDT Jefe Cruz Danae  right   Family History:    Cancer  Father  Diabetes mellitus  Mother ()  Hypertension  Father  Heart disease  Father  Heart attack  Mother ()     Procedure history:    Rotator cuff repair (SNOMED CT 731724961) performed by Jacques Paris MD on 10/17/2016 at 56 Years.  Comments:  10/27/2016 8:27 AM - [place] Holman  Left.    10/6/2016 9:16 AM - Castillo Nogueira MD, Dr.  [facility]  10/17/2016   Right C5-7 Cervical foraminotomy in  at 54 Years.  Comments:  1/15/2015 10:29 AM - Castillo Nogueira MD, Dr. [facility]  Right Hip Arthroscopy and Psoas Tenotomy in  at 53 Years.  Colonoscopy (SNOMED CT 076847077) in  at 52 Years.  Arthroscopy of Shoulder in  at 50 Years.  Comments:  2010 3:19 PM - Castillo Nogueira MD, Dr.  C6-7 Laminectomy in  at 50 Years.  Comments:  2010 3:20 PM - Castillo Nogueira MD  left   Dr. Urbano [facility]  hip replacement in  at 47 Years.  Comments:  2010 4:56 PM - Danae Cruz  Repair of rotator cuff of  shoulder (SNOMED CT 2451479223) in 2005 at 45 Years.  Comments:  6/28/2010 4:58 PM - Danae Cruz  left  Arthroscopy of shoulder (SNOMED CT 192992695) in 1999 at 39 Years.  Comments:  6/28/2010 4:55 PM - Danae Cruz  Right  Resection of clavicle (SNOMED CT 06754153) in 1996 at 36 Years.  Comments:  6/28/2010 5:00 PM - Danae Cruz  left distal  Repair of diaphragmatic hiatal hernia (SNOMED CT 216811783) in 1992 at 32 Years.  Comments:  6/28/2010 4:58 PM - Danae Cruz  Francis Fundaplication  Arthroscopy of knee (SNOMED CT 317540622) in 1990 at 30 Years.  Comments:  6/28/2010 4:54 PM - Danae Cruz  Right   Social History:        Alcohol Assessment: Current            Current, Beer (12 oz), 1-2 times per week, 3 drinks/episode average.      Tobacco Assessment: Denies Tobacco Use      Substance Abuse Assessment: Denies Substance Abuse      Employment and Education Assessment            Employed                     Comments:                      01/15/2015 - [employer], [employer]                     [address] [place] [address]      Home and Environment Assessment            Marital status:  (Living together).  Lives with Self, Spouse.  2 children.  Living situation:               Home/Independent.      Exercise and Physical Activity Assessment: Regular exercise        Physical Examination   Vital signs reviewed  and within acceptable limits    Vital Signs   3/20/2018 8:25 AM CDT Peripheral Pulse Rate 76 bpm    Systolic Blood Pressure 130 mmHg    Diastolic Blood Pressure 86 mmHg  HI    Mean Arterial Pressure 101 mmHg    BP Site Right arm      Measurements from flowsheet : Measurements   3/20/2018 8:25 AM CDT Height Measured - Standard 71 in    Weight Measured - Standard 306.4 lb    BSA 2.64 m2    Body Mass Index 42.73 kg/m2  HI      General:  Alert and oriented, No acute distress.    Eye:  Pupils are equal, round and reactive to light, Extraocular movements are intact, Normal conjunctiva.    HENT:  Normocephalic, Tympanic  membranes are clear, Normal hearing, Oral mucosa is moist, No pharyngeal erythema.    Neck:  Supple, Non-tender, No carotid bruit, No jugular venous distention, No lymphadenopathy, No thyromegaly.    Respiratory:  Lungs are clear to auscultation, Respirations are non-labored, Breath sounds are equal, Symmetrical chest wall expansion, No chest wall tenderness.    Cardiovascular:  Normal rate, Regular rhythm, No murmur, No gallop, Good pulses equal in all extremities, Normal peripheral perfusion, No edema.    Gastrointestinal:  Soft, Non-tender, Non-distended, Normal bowel sounds, No organomegaly.    Lymphatics:  No lymphadenopathy neck, axilla, groin.    Musculoskeletal:  Normal range of motion, Normal strength, No tenderness, No swelling, No deformity, Normal gait.    Integumentary:  Warm, Dry, Pink.    Neurologic:  Normal sensory, Normal motor function, No focal deficits, Cranial Nerves II-XII are grossly intact, Normal deep tendon reflexes.    Psychiatric:  Cooperative, Appropriate mood & affect, Normal judgment.

## 2022-02-15 NOTE — NURSING NOTE
Comprehensive Intake Entered On:  2/16/2021 11:27 AM CST    Performed On:  2/16/2021 11:21 AM CST by Sarah Cervantes CMA               Summary   Chief Complaint :   Pt here for med ck   Weight Measured :   294.2 lb(Converted to: 294 lb 3 oz, 133.447 kg)    Height Measured :   71 in(Converted to: 5 ft 11 in, 180.34 cm)    Body Mass Index :   41.03 kg/m2 (HI)    Body Surface Area :   2.58 m2   Height/Length Estimated :   71 in(Converted to: 5 ft 11 in, 180.34 cm)    Systolic Blood Pressure :   136 mmHg (HI)    Diastolic Blood Pressure :   70 mmHg   Mean Arterial Pressure :   92 mmHg   Peripheral Pulse Rate :   99 bpm   Oxygen Saturation :   97 %   Race :      Languages :   English   Ethnicity :   Not  or    Sarah Cervantes CMA - 2/16/2021 11:21 AM CST   Health Status   Allergies Verified? :   Yes   Medication History Verified? :   Yes   Immunizations Current :   Yes   Medical History Verified? :   Yes   Pre-Visit Planning Status :   Completed   Tobacco Use? :   Former smoker   Sarah Cervantes CMA - 2/16/2021 11:21 AM CST   Consents   Consent for Immunization Exchange :   Consent Granted   Consent for Immunizations to Providers :   Consent Granted   Sarah Cervantes CMA 2/16/2021 11:21 AM CST   Meds / Allergies   (As Of: 2/16/2021 11:27:31 AM CST)   Allergies (Active)   No Known Medication Allergies  Estimated Onset Date:   Unspecified ; Created By:   Elise Lubin LPN; Reaction Status:   Active ; Category:   Drug ; Substance:   No Known Medication Allergies ; Type:   Allergy ; Updated By:   Elise Lubin LPN; Reviewed Date:   2/16/2021 11:22 AM CST        Medication List   (As Of: 2/16/2021 11:27:31 AM CST)   Prescription/Discharge Order    atorvastatin  :   atorvastatin ; Status:   Prescribed ; Ordered As Mnemonic:   atorvastatin 20 mg oral tablet ; Simple Display Line:   20 mg, 1 tab(s), Oral, daily, 90 tab(s), 1 Refill(s) ; Ordering Provider:   Castillo Nogueira MD; Catalog Code:    atorvastatin ; Order Dt/Tm:   10/6/2020 10:16:26 AM CDT          gemfibrozil  :   gemfibrozil ; Status:   Prescribed ; Ordered As Mnemonic:   gemfibrozil 600 mg oral tablet ; Simple Display Line:   See Instructions, TAKE ONE (1) TABLET BY MOUTH TWO (2) TIMES, 180 tab(s), 0 Refill(s) ; Ordering Provider:   Castillo Nogueira MD; Catalog Code:   gemfibrozil ; Order Dt/Tm:   8/6/2020 1:17:38 PM CDT          losartan  :   losartan ; Status:   Prescribed ; Ordered As Mnemonic:   losartan 50 mg oral tablet ; Simple Display Line:   50 mg, 1 tab(s), Oral, daily, 90 tab(s), 1 Refill(s) ; Ordering Provider:   Castillo Nogueira MD; Catalog Code:   losartan ; Order Dt/Tm:   8/17/2020 4:05:55 PM CDT          sildenafil  :   sildenafil ; Status:   Prescribed ; Ordered As Mnemonic:   sildenafil 20 mg oral tablet ; Simple Display Line:   40 mg, 2 tab(s), Oral, prn, as needed for planned sexual activity, PRN: Other (see comment), 20 tab(s), 5 Refill(s) ; Ordering Provider:   Castillo Nogueira MD; Catalog Code:   sildenafil ; Order Dt/Tm:   9/24/2019 2:56:03 PM CDT            ID Risk Screen   Recent Travel History :   No recent travel   Family Member Travel History :   No recent travel   Other Exposure to Infectious Disease :   Unknown   COVID-19 Testing Status :   No COVID-19 test performed   Sarah Cervantes CMA - 2/16/2021 11:21 AM CST

## 2022-02-15 NOTE — PROGRESS NOTES
Patient:   YAZMIN GRANADOS            MRN: [id number]            FIN: [id number]               Age:   58 years     Sex:  Male     :  1960   Associated Diagnoses:   Degenerative arthritis of finger   Author:   Castillo Nogueira MD      Impression and Plan   Diagnosis     Degenerative arthritis of finger ([id number] M19.041).     Course:  Improving.    Plan:  Alleve 2 tablets TID for two weeks.  Follow up if not improving..    Orders     Orders   Charges (Evaluation and Management):  88061 office outpatient visit 15 minutes (Charge) (Order): Quantity: 1, Degenerative arthritis of finger.        Visit Information      Date of Service: 2018 11:19 am  Performing Location: [facility]  Encounter#: [id number]      Primary Care Provider (PCP):  Castillo Nogueira MD    NPI# [id number]   Visit type:  New symptom.    Accompanied by:  No one.    Source of history:  Self.    History limitation:  None.       Chief Complaint   2018 11:23 AM CDT   Pt here for right knuckle pain        History of Present Illness             The patient presents with finger pain.  The location of pain is the right, middle finger and MCP joint.  The pain is described as aching.  The severity of the pain is moderate.  The timing/course of the pain is constant.  The pain has lasted for 2 week(s).  The context of the pain: occurred in association with work.  Exacerbating factors consist of exertion and movement.  Relieving factors consist of analgesics and cold application.  Associated symptoms consist of decreased range of motion and joint stiffness.  Prior treatment consists of none.        Review of Systems   Constitutional:  Negative.    Eye:  Negative.    Ear/Nose/Mouth/Throat:  Negative.    Respiratory:  Negative.    Cardiovascular:  Negative.    Gastrointestinal:  Negative.    Genitourinary:  Negative.    Hematology/Lymphatics:  Negative.    Endocrine:  Negative.    Immunologic:  Negative.    Musculoskeletal:  Negative.     Integumentary:  Negative.    Neurologic:  Negative.    Psychiatric:  Negative.    All other systems reviewed and negative      Health Status   Allergies:    Allergic Reactions (Selected)  No known allergies   Medications:  (Selected)   Prescriptions  Prescribed  atorvastatin 20 mg oral tablet: 1 tab(s) ( 20 mg ), po, daily, # 90 tab(s), 3 Refill(s), Type: Maintenance, Pharmacy: [facility], 1 tab(s) po daily  naproxen sodium 220 mg oral tablet: 2 tab(s) ( 440 mg ), PO, q8hr, PRN: for pain, # 60 tab(s), 0 Refill(s), Type: Maintenance, OTC (Rx)  Documented Medications  Documented  aspirin 81 mg oral delayed release tablet: 1 tab(s) ( 81 mg ), po, daily, 0 Refill(s), Type: Maintenance   Problem list:    All Problems (Selected)  Anal Fissure / ICD-9-.0 / Confirmed  CAD (coronary artery disease) / SNOMED CT 8594468924 / Confirmed  Cervical radiculopathy at C6 / SNOMED CT 921489380 / Confirmed  Erectile Dysfunction / SNOMED CT 762542514 / Confirmed  Hypertriglyceridemia / ICD-9-.1 / Confirmed  Morbid obesity / SNOMED CT 719680615 / Confirmed  Obese / ICD-9-.00 / Probable  Osteoarthritis / ICD-9-.90 / Confirmed      Histories   Past Medical History:    Resolved  *[email or web address] - Cellulitis LLE: Onset on 2/5/2012 at 52 years.  Resolved.  Repair of diaphragmatic hiatal hernia (127159965):  Resolved in 1992 at 31 years.  Comments:  6/28/2010 CDT 4:26 PM CDT - Danae Cruz  Francis Fundaplication  Arthroscopy of knee (290917879):  Resolved in 1990 at 29 years.  Comments:  6/28/2010 CDT 4:28 PM CDT Danae Real  Right knee  Resection of clavicle (25414093):  Resolved in 1996 at 35 years.  Comments:  6/28/2010 CDT 4:33 PM CDT Danae Real  Left-distal  Arthroscopy of shoulder (954772600):  Resolved in 1999 at 38 years.  Comments:  6/28/2010 CDT 4:34 PM CDT Danae Real  right  Repair of rotator cuff of shoulder (7897750831):  Resolved in 2005 at 44 years.  Comments:  6/28/2010 CDT 4:37  PM CDT - Danae Cruz  left  Hip replacement (9288780172):  Resolved in  at 46 years.  Comments:  2010 CDT 4:38 PM CDT - Cruz Danae   Family History:    Cancer  Father  Diabetes mellitus  Mother ()  Hypertension  Father  Heart disease  Father  Heart attack  Mother ()     Procedure history:    Rotator cuff repair (SNOMED CT 820608749) performed by Jacques Paris MD on 10/17/2016 at 56 Years.  Comments:  10/27/2016 8:27 AM - [place] Holman  Left.    10/6/2016 9:16 AM - Castillo Nogueira MD, Dr.  [facility]  10/17/2016   Right C5-7 Cervical foraminotomy in  at 54 Years.  Comments:  1/15/2015 10:29 AM - Castillo Nogueira MD, Dr. [facility]  Right Hip Arthroscopy and Psoas Tenotomy in  at 53 Years.  Colonoscopy (SNOMED CT 875413290) in  at 52 Years.  Arthroscopy of Shoulder in  at 50 Years.  Comments:  2010 3:19 PM - Castillo Nogueira MD, Dr.  C6-7 Laminectomy in  at 50 Years.  Comments:  2010 3:20 PM - Castillo Nogueira MD, Dr. [facility]  hip replacement in  at 47 Years.  Comments:  2010 4:56 PM - Danae Cruz  Repair of rotator cuff of shoulder (SNOMED CT 991961) in  at 45 Years.  Comments:  2010 4:58 PM - Danae Cruz  left  Arthroscopy of shoulder (SNOMED CT 090527612) in  at 39 Years.  Comments:  2010 4:55 PM - Danae Cruz  Right  Resection of clavicle (SNOMED CT 55962728) in  at 36 Years.  Comments:  2010 5:00 PM - Danae Cruz distal  Repair of diaphragmatic hiatal hernia (SNOMED CT 297642431) in  at 32 Years.  Comments:  2010 4:58 PM - Danae Cruz  Francis Fundaplication  Arthroscopy of knee (SNOMED CT 184868005) in  at 30 Years.  Comments:  2010 4:54 PM - Cruz , Danae  Right   Social History:        Alcohol Assessment: Current            Current, Beer (12 oz), 1-2 times per week, 3 drinks/episode average.      Tobacco Assessment:  Denies Tobacco Use      Substance Abuse Assessment: Denies Substance Abuse      Employment and Education Assessment            Employed                     Comments:                      01/15/2015 - [employer], [employer]      Home and Environment Assessment            Marital status:  (Living together).  Lives with Self, Spouse.  2 children.  Living situation:               Home/Independent.      Exercise and Physical Activity Assessment: Regular exercise            Exercise frequency: Daily.  Exercise type: Walking.        Physical Examination   Vital Signs   7/23/2018 11:23 AM CDT Peripheral Pulse Rate 75 bpm    Systolic Blood Pressure 164 mmHg  HI    Diastolic Blood Pressure 90 mmHg  HI    Mean Arterial Pressure 115 mmHg    BP Site Left arm      Measurements from flowsheet : Measurements   7/23/2018 11:23 AM CDT Height Measured - Standard 71 in    Weight Measured - Standard 306 lb    BSA 2.63 m2    Body Mass Index 42.67 kg/m2  HI      General:  Alert and oriented X 3, No acute distress, Warm, Pink, Intact.         Appearance: Within normal limits, Well nourished, Calm.         Hydration: Within normal limits.         Psych: Within normal limits, Appropriate mood and affect, Cooperative, Normal judgment.    Musculoskeletal:       Upper extremity exam: Fingers ( Right, Third finger, Metacarpal phalangeal, Deformity negative, No erythema, No ecchymosis, No mass, No nodule, Swelling, Tenderness, No wound, No crepitus, Strength  5 /5, Normal range of motion ).

## 2022-02-15 NOTE — NURSING NOTE
Depression Screening Entered On:  8/17/2020 4:16 PM CDT    Performed On:  8/17/2020 4:15 PM CDT by Sarah Cervantes CMA               Depression Screening   Little Interest - Pleasure in Activities :   Not at all   Feeling Down, Depressed, Hopeless :   Not at all   Initial Depression Screen Score :   0 Score   Poor Appetite or Overeating :   Not at all   Trouble Falling or Staying Asleep :   Not at all   Feeling Tired or Little Energy :   Not at all   Feeling Bad About Yourself :   Not at all   Trouble Concentrating :   Not at all   Moving or Speaking Slowly :   Not at all   Thoughts Better Off Dead or Hurting Self :   Not at all   CITLALLI Difficulty with Work, Home, Others :   Not difficult at all   Detailed Depression Screen Score :   0    Total Depression Screen Score :   0    Sarah Cervantes CMA - 8/17/2020 4:15 PM CDT

## 2022-02-15 NOTE — PROGRESS NOTES
Patient:   YAZMIN GRANADOS            MRN: [id number]            FIN: [id number]               Age:   58 years     Sex:  Male     :  1960   Associated Diagnoses:   Left foot pain; Benign essential HTN   Author:   Castillo Nogueira MD      Impression and Plan   Diagnosis     Left foot pain ([id number] M79.672).     Course:  Worsening.    Orders     Orders   Charges (Evaluation and Management):  85439 office outpatient visit 25 minutes (Charge) (Order): Quantity: 1, Benign essential HTN  Left foot pain.     Orders (Selected)   Outpatient Orders  Order  XR Foot Min 3 Views Left (Request): Left foot pain  Benign essential HTN.     Diagnosis     Benign essential HTN (EBN58-CM I10).     Course:  Worsening.    Orders     Orders (Selected)   Prescriptions  Prescribed  losartan 50 mg oral tablet: = 1 tab(s) ( 50 mg ), PO, Daily, # 90 tab(s), 1 Refill(s), Type: Maintenance, [facility], 1 tab(s) Oral daily.        Visit Information   Visit type:  New symptom.    Accompanied by:  No one.    Source of history:  Self.    History limitation:  None.       Chief Complaint   2018 11:20 AM CDT   Pt here for elevated BP        History of Present Illness             The patient presents for evaluation of hypertension.  The hypertension  is characterized by no symptoms.  The severity of the hypertension symptom(s) is moderate.  The timing/course of hypertension symptom(s) is episodic.  The elevated blood pressure was first identified during a routine physical exam.  There are no modifying factors.  Associated symptoms consist of none.  Prior treatment consists of none.               The patient presents with pain.  The symptom(s) is described as pain.  The location of symptom(s) is the left and lateral.  The severity of the symptom(s) is moderate.  The timing/course of symptom(s) is episodic.  The symptom(s) has lasted for an unknown duration of time.  Radiation of pain: none.  Exacerbating factors consist of  movement and walking.  Relieving factors consist of rest.  Associated symptoms consist of none.        Review of Systems   Constitutional:  Negative.    Eye:  Negative.    Ear/Nose/Mouth/Throat:  Negative.    Respiratory:  Negative.    Cardiovascular:  Negative except as documented in history of present illness.    Gastrointestinal:  Negative.    Genitourinary:  Negative.    Hematology/Lymphatics:  Negative.    Endocrine:  Negative.    Immunologic:  Negative.    Musculoskeletal:  Negative except as documented in history of present illness.    Integumentary:  Negative.    Neurologic:  Negative.    Psychiatric:  Negative.    All other systems reviewed and negative      Health Status   Allergies:    Allergic Reactions (Selected)  No known allergies   Medications:  (Selected)   Prescriptions  Prescribed  atorvastatin 20 mg oral tablet: 1 tab(s) ( 20 mg ), po, daily, # 90 tab(s), 3 Refill(s), Type: Maintenance, Pharmacy: [facility], 1 tab(s) po daily  losartan 50 mg oral tablet: = 1 tab(s) ( 50 mg ), PO, Daily, # 90 tab(s), 1 Refill(s), Type: Maintenance, Pharmacy: [facility], 1 tab(s) Oral daily  naproxen sodium 220 mg oral tablet: 2 tab(s) ( 440 mg ), PO, q8hr, PRN: for pain, # 60 tab(s), 0 Refill(s), Type: Maintenance, OTC (Rx)  Documented Medications  Documented  aspirin 81 mg oral delayed release tablet: 1 tab(s) ( 81 mg ), po, daily, 0 Refill(s), Type: Maintenance   Problem list:    All Problems (Selected)  Anal Fissure / ICD-9-.0 / Confirmed  Benign essential HTN / SNOMED CT 3245159 / Confirmed  CAD (coronary artery disease) / SNOMED CT 2613010674 / Confirmed  Cervical radiculopathy at C6 / SNOMED CT 862508874 / Confirmed  Erectile Dysfunction / SNOMED CT 315756262 / Confirmed  Hypertriglyceridemia / ICD-9-.1 / Confirmed  Morbid obesity / SNOMED CT 183218030 / Confirmed  Obese / ICD-9-.00 / Probable  Osteoarthritis / ICD-9-.90 / Confirmed      Histories   Past Medical  History:    Resolved  *[email or web address]@[email or web address] - Cellulitis LLE: Onset on 2/5/2012 at 52 years.  Resolved.  Repair of diaphragmatic hiatal hernia (780570253):  Resolved in 1992 at 31 years.  Comments:  6/28/2010 CDT 4:26 PM CDT - CruzDanae kendall  Francis Fundaplication  Arthroscopy of knee (334242636):  Resolved in 1990 at 29 years.  Comments:  6/28/2010 CDT 4:28 PM CDT - Cruz Sunnyis  Right knee  Resection of clavicle (14933672):  Resolved in 1996 at 35 years.  Comments:  6/28/2010 CDT 4:33 PM CDT - Nancy Sunnyis  Left-distal  Arthroscopy of shoulder ([id number]):  Resolved in 1999 at 38 years.  Comments:  6/28/2010 CDT 4:34 PM CDT Danae Real  right  Repair of rotator cuff of shoulder (1927863816):  Resolved in 2005 at 44 years.  Comments:  6/28/2010 CDT 4:37 PM CDT Jefe Cruz Danae  left  Hip replacement (1641521018):  Resolved in 2007 at 46 years.  Comments:  6/28/2010 CDT 4:38 PM CDT Jefe Cruz Danae  right   Procedure history:    Rotator cuff repair (SNOMED CT 381046165) performed by Jacques Paris MD on 10/17/2016 at 56 Years.  Comments:  10/27/2016 8:27 AM - [place] Holman.    10/6/2016 9:16 AM - Castillo Nogueira MD, Dr.  [facility]  10/17/2016   Right C5-7 Cervical foraminotomy in 2014 at 54 Years.  Comments:  1/15/2015 10:29 AM - Castillo Nogueira MD, Dr. [facility]  Right Hip Arthroscopy and Psoas Tenotomy in 2013 at 53 Years.  Colonoscopy (SNOMED CT 092638991) in 2012 at 52 Years.  Arthroscopy of Shoulder in 2010 at 50 Years.  Comments:  6/29/2010 3:19 PM - Castillo Nogueira MD, Dr.  C6-7 Laminectomy in 2010 at 50 Years.  Comments:  6/29/2010 3:20 PM - Castillo Nogueira MD, Dr.  [facility]  hip replacement in 2007 at 47 Years.  Comments:  6/28/2010 4:56 PM - Danae Cruz  Right  Repair of rotator cuff of shoulder (SNOMED CT 3093977300) in 2005 at 45 Years.  Comments:  6/28/2010 4:58 PM - Danae Cruz  left  Arthroscopy of shoulder (SNOMED CT 815365439) in 1999 at 39  Years.  Comments:  6/28/2010 4:55 PM - Danae Cruz  Right  Resection of clavicle (SNOMED CT 75644265) in 1996 at 36 Years.  Comments:  6/28/2010 5:00 PM - Danae Cruz  left distal  Repair of diaphragmatic hiatal hernia (SNOMED CT 463448640) in 1992 at 32 Years.  Comments:  6/28/2010 4:58 PM - Danae Cruz  Francis Fundaplication  Arthroscopy of knee (SNOMED CT 351550890) in 1990 at 30 Years.  Comments:  6/28/2010 4:54 PM - Danae Cruz  Right   Social History:        Alcohol Assessment: Current            Current, Beer (12 oz), 1-2 times per week, 3 drinks/episode average.      Tobacco Assessment: Denies Tobacco Use      Substance Abuse Assessment: Denies Substance Abuse      Employment and Education Assessment            Employed                     Comments:                      01/15/2015 - [employer], [employer]      Home and Environment Assessment            Marital status:  (Living together).  Lives with Self, Spouse.  2 children.  Living situation:               Home/Independent.      Exercise and Physical Activity Assessment: Regular exercise            Exercise frequency: Daily.  Exercise type: Walking.        Physical Examination   Vital Signs   9/19/2018 11:20 AM CDT Peripheral Pulse Rate 84 bpm    Systolic Blood Pressure 130 mmHg    Diastolic Blood Pressure 74 mmHg    Mean Arterial Pressure 93 mmHg    BP Site Right arm    Oxygen Saturation 96 %      Measurements from flowsheet : Measurements   9/19/2018 11:20 AM CDT Height Measured - Standard 71 in    Weight Measured - Standard 302 lb    BSA 2.62 m2    Body Mass Index 42.12 kg/m2  HI      General:  No acute distress.    Neck:  Supple, No lymphadenopathy, No thyromegaly.    Respiratory:  Lungs are clear to auscultation, Respirations are non-labored, Breath sounds are equal, Symmetrical chest wall expansion.    Cardiovascular:  Normal rate, Regular rhythm, No murmur, No gallop, Good pulses equal in all extremities,  Normal peripheral perfusion, No edema.    Gastrointestinal:  Soft, Non-tender, Non-distended, Normal bowel sounds, No organomegaly.    Musculoskeletal:       Lower extremity exam: Foot ( Left, No deformity, No erythema, No ecchymosis, No mass, No nodule, No swelling, No tenderness, No numbness, Normal ROM ).    Integumentary:  Warm, Dry, Pink.    Neurologic:  Alert, Oriented.    Psychiatric:  Cooperative, Appropriate mood & affect.       Review / Management   Radiology results   X-ray, Three views left foot , Reveals no acute disease process, Radiograph(s) result as interpreted by ordering provider reviewed with patient.  Radiologist will also interpret the radiograph(s) and patient will be informed if result is modified when both interpretations are compared.

## 2022-02-15 NOTE — PROGRESS NOTES
Patient:   YAZMIN GRANADOS            MRN: [id number]            FIN: [id number]               Age:   59 years     Sex:  Male     :  1960   Associated Diagnoses:   Hypertriglyceridemia; Benign essential HTN; Lumbar radiculopathy   Author:   Castillo Nogueira MD      Impression and Plan   Diagnosis     Hypertriglyceridemia (EVR25-XQ E78.1).     Course:  Progressing as expected.    Orders     Orders   Charges (Evaluation and Management):  58141 office outpatient visit 25 minutes (Charge) (Order): Quantity: 1, Hypertriglyceridemia  Benign essential HTN  Lumbar radiculopathy.     Orders (Selected)   Prescriptions  Prescribed  atorvastatin 20 mg oral tablet: = 1 tab(s) ( 20 mg ), po, daily, # 90 tab(s), 1 Refill(s), Type: Maintenance, Pharmacy: [facility], 1 tab(s) Oral daily.     Diagnosis     Benign essential HTN (ISO77-KP I10).     Course:  Well controlled.    Orders     Orders (Selected)   Prescriptions  Prescribed  atorvastatin 20 mg oral tablet: = 1 tab(s) ( 20 mg ), po, daily, # 90 tab(s), 1 Refill(s), Type: Maintenance, Pharmacy: [facility], 1 tab(s) Oral daily.     Diagnosis     Lumbar radiculopathy (DRI65-QU M54.16).     Course:  Worsening.    Orders     Orders (Selected)   Outpatient Orders  Order  MRI Lumbar Spine (Request): Lumbar radiculopathy.        Visit Information      Date of Service: 2019 08:49 am  Performing Location: [facility]  Encounter#: [id number]      Primary Care Provider (PCP):  Castillo Nogueira MD    NPI# [id number]   Visit type:  Scheduled follow-up.    Accompanied by:  No one.    Source of history:  Self.    Referral source:  Self.    History limitation:  None.       Chief Complaint   2019 8:51 AM CDT    Pt here for med ck        History of Present Illness             The patient presents for follow-up evaluation of hypertension.  The quality of hypertension symptom(s) since the patient's last visit is described as being unchanged.  The severity  of the hypertension symptom(s) since the last visit is moderate.  Since the patient's last visit, the timing/course of hypertension symptom(s) is constant.  Exacerbating factors consist of none.  Relieving factors consist of medication.  Associated symptoms consist of none.  Prior treatment consists of lifestyle modification (weight reduction, dietary sodium restriction, increased physical activity, adoption of DASH eating plan).  Medical encounters: none.  Compliance problems: none.        Interval History   Sciatica   Change in activity tolerance decreased tolerance.  Prn use of analgesics increased.  The course is worsening.  The effect on daily activities is change in activity level, change in ambulation, no change in household functions, no change in sexual activity, no change in sleeping patterns and no change in work/school duties.  Associated symptoms characterized by low back pain and paresthesia, no gait disturbance or leg pain.     Cholesterol Management   Total cholesterol results < 200 mg/dL (optimal).  LDL cholesterol results < 100 mg/dL (optimal).  Triglyceride results < 150 mg/dL (normal).  The course is progressing as expected.  The effect on daily activities is no change in activity level and no change in eating habits.  Associated symptoms characterized by no fatigue, chest pain, joint pain, muscle weakness or myalgias.        Review of Systems   Constitutional:  Negative.    Eye:  Negative.    Ear/Nose/Mouth/Throat:  Negative.    Respiratory:  Negative.    Cardiovascular:  Negative.    Gastrointestinal:  Negative.    Genitourinary:  Negative.    Hematology/Lymphatics:  Negative.    Endocrine:  Negative.    Immunologic:  Negative.    Musculoskeletal:  Negative except as documented in history of present illness.    Integumentary:  Negative.    Neurologic:  Negative.    Psychiatric:  Negative.    All other systems reviewed and negative      Health Status   Allergies:    Allergic Reactions  (Selected)  No known allergies   Medications:  (Selected)   Prescriptions  Prescribed  atorvastatin 20 mg oral tablet: = 1 tab(s) ( 20 mg ), po, daily, # 90 tab(s), 1 Refill(s), Type: Maintenance, Pharmacy: [facility], 1 tab(s) Oral daily  losartan 50 mg oral tablet: = 1 tab(s) ( 50 mg ), PO, Daily, # 90 tab(s), 1 Refill(s), Type: Maintenance, Pharmacy: [facility], 1 tab(s) Oral daily  naproxen sodium 220 mg oral tablet: 2 tab(s) ( 440 mg ), PO, q8hr, PRN: for pain, # 60 tab(s), 0 Refill(s), Type: Maintenance, OTC (Rx)   Problem list:    All Problems (Selected)  Anal Fissure / ICD-9-.0 / Confirmed  Benign essential HTN / SNOMED CT 9173490 / Confirmed  CAD (coronary artery disease) / SNOMED CT 8300307210 / Confirmed  Cervical radiculopathy at C6 / SNOMED CT 975314731 / Confirmed  Erectile Dysfunction / SNOMED CT 893618192 / Confirmed  Hypertriglyceridemia / ICD-9-.1 / Confirmed  Morbid obesity / SNOMED CT 807444138 / Confirmed  Obese / ICD-9-.00 / Probable  Osteoarthritis / ICD-9-.90 / Confirmed      Histories   Past Medical History:    Resolved  *Hospitalized@[facility] - Cellulitis LLE: Onset on 2/5/2012 at 52 years.  Resolved.  Repair of diaphragmatic hiatal hernia (970287974):  Resolved in 1992 at 31 years.  Comments:  6/28/2010 CDT 4:26 PM CDT - Danae Cruz  Francis [employer]da[employer]  Arthroscopy of knee (685047552):  Resolved in 1990 at 29 years.  Comments:  6/28/2010 CDT 4:28 PM CDT Danae Real  Right knee  Resection of clavicle (91663898):  Resolved in 1996 at 35 years.  Comments:  6/28/2010 CDT 4:33 PM CDT Danae Real  Left-distal  Arthroscopy of shoulder ([id number]):  Resolved in 1999 at 38 years.  Comments:  6/28/2010 CDT 4:34 PM CDT Danae Real  right  Repair of rotator cuff of shoulder (4930735983):  Resolved in 2005 at 44 years.  Comments:  6/28/2010 CDT 4:37 PM CDT - Danae Cruz  left  Hip replacement (4361301403):  Resolved in 2007 at 46  years.  Comments:  2010 CDT 4:38 PM CDT Danae Real   Family History:    Cancer  Father  Diabetes mellitus  Mother ()  Hypertension  Father  Heart disease  Father  Heart attack  Mother ()     Procedure history:    Rotator cuff repair (SNOMED CT 172783079) performed by Jacques Paris MD on 10/17/2016 at 56 Years.  Comments:  10/27/2016 8:27 AM Jamaica Wong.    10/6/2016 9:16 AM Castillo Kapoor MD, Dr.  [facility]  10/17/2016   Right C5-7 Cervical foraminotomy in  at 54 Years.  Comments:  1/15/2015 10:29 AM Castillo Garg MD, Dr. [facility]  Right Hip Arthroscopy and Psoas Tenotomy in  at 53 Years.  Colonoscopy (SNOMED CT 164460614) in  at 52 Years.  Arthroscopy of Shoulder in  at 50 Years.  Comments:  2010 3:19 PM Castillo Kapoor MD, Dr.  C6-7 Laminectomy in  at 50 Years.  Comments:  2010 3:20 PM Castillo Kapoor MD, Dr. [facility]  hip replacement in  at 47 Years.  Comments:  2010 4:56 PM Danae Mccall  Right  Repair of rotator cuff of shoulder (SNOMED CT 2855948688) in  at 45 Years.  Comments:  2010 4:58 PM CDT Danae Real  left  Arthroscopy of shoulder (SNOMED CT 755484298) in  at 39 Years.  Comments:  2010 4:55 PM CDDanae Eng  Right  Resection of clavicle (SNOMED CT 30033296) in  at 36 Years.  Comments:  2010 5:00 PM CDT Danae Real distal  Repair of diaphragmatic hiatal hernia (SNOMED CT 516234274) in  at 32 Years.  Comments:  2010 4:58 PM CDT Danae Real  [employer]is [employer]  Arthroscopy of knee (SNOMED CT 123796008) in  at 30 Years.  Comments:  2010 4:54 PM CDDanae Eng   Social History:        Alcohol Assessment: Current            Current, Beer (12 oz), 1-2 times per week, 3 drinks/episode average.      Tobacco Assessment: Denies Tobacco Use      Substance Abuse  Assessment: Denies Substance Abuse      Employment and Education Assessment            Employed                     Comments:                      01/15/2015 - [employer], [employer]      Home and Environment Assessment            Marital status:  (Living together).  Lives with Self, Spouse.  2 children.  Living situation:               Home/Independent.      Exercise and Physical Activity Assessment: Regular exercise            Exercise frequency: Daily.  Exercise type: Walking.        Physical Examination   Vital Signs   9/16/2019 8:51 AM CDT Peripheral Pulse Rate 66 bpm    Systolic Blood Pressure 122 mmHg    Diastolic Blood Pressure 82 mmHg  HI    Mean Arterial Pressure 95 mmHg    Oxygen Saturation 97 %      Measurements from flowsheet : Measurements   9/16/2019 8:51 AM CDT Height Measured - Standard 71 in    Weight Measured - Standard 309 lb    BSA 2.65 m2    Body Mass Index 43.09 kg/m2  HI      General:  Mild distress.    Neck:  Supple, Non-tender, No carotid bruit, No jugular venous distention, No lymphadenopathy, No thyromegaly.    Respiratory:  Lungs are clear to auscultation, Respirations are non-labored.    Cardiovascular:  Normal rate, Regular rhythm, Good pulses equal in all extremities, Normal peripheral perfusion, No edema.    Musculoskeletal:  Normal gait, Normal station.         Spine/torso exam: Lumbar ( Bilateral, No deformity, No erythema, No ecchymosis, No swelling, Tenderness, Range of motion ( Diminished ), Straight leg raising, supine ( Negative ) ).    Integumentary:  Warm, Dry, Pink.    Neurologic:  Alert, Oriented, Normal sensory, Normal motor function, No focal deficits.    Psychiatric:  Cooperative, Appropriate mood & affect, Normal judgment.

## 2022-02-15 NOTE — NURSING NOTE
Comprehensive Intake Entered On:  1/27/2020 2:59 PM CST    Performed On:  1/27/2020 2:48 PM CST by Sarah Cervantes CMA               Summary   Chief Complaint :   Pt here for bx of spot on hand   Weight Measured :   309 lb(Converted to: 309 lb 0 oz, 140.16 kg)    Height Measured :   71 in(Converted to: 5 ft 11 in, 180.34 cm)    Body Mass Index :   43.09 kg/m2 (HI)    Body Surface Area :   2.65 m2   Systolic Blood Pressure :   122 mmHg   Diastolic Blood Pressure :   62 mmHg   Mean Arterial Pressure :   82 mmHg   Peripheral Pulse Rate :   70 bpm   Oxygen Saturation :   96 %   Race :      Languages :   English   Ethnicity :   Not  or    Sraah Cervantes CMA - 1/27/2020 2:48 PM CST   Health Status   Allergies Verified? :   Yes   Medication History Verified? :   Yes   Immunizations Current :   Yes   Medical History Verified? :   Yes   Pre-Visit Planning Status :   Completed   Tobacco Use? :   Never smoker   Sarah Cervantes CMA - 1/27/2020 2:48 PM CST   Consents   Consent for Immunization Exchange :   Consent Granted   Consent for Immunizations to Providers :   Consent Granted   Sarah Cervantes CMA - 1/27/2020 2:48 PM CST   Meds / Allergies   (As Of: 1/27/2020 2:59:21 PM CST)   Allergies (Active)   No known allergies  Estimated Onset Date:   Unspecified ; Created By:   Myriam Graves; Reaction Status:   Active ; Category:   Drug ; Substance:   No known allergies ; Type:   Allergy ; Updated By:   Myriam Graves; Reviewed Date:   1/27/2020 2:48 PM CST        Medication List   (As Of: 1/27/2020 2:59:21 PM CST)   Prescription/Discharge Order    atorvastatin  :   atorvastatin ; Status:   Prescribed ; Ordered As Mnemonic:   atorvastatin 20 mg oral tablet ; Simple Display Line:   20 mg, 1 tab(s), po, daily, 90 tab(s), 1 Refill(s) ; Ordering Provider:   Castillo Nogueira MD; Catalog Code:   atorvastatin ; Order Dt/Tm:   9/16/2019 9:21:04 AM CDT          gemfibrozil  :   gemfibrozil ; Status:   Prescribed ;  Ordered As Mnemonic:   gemfibrozil 600 mg oral tablet ; Simple Display Line:   600 mg, 1 tab(s), Oral, bid, 180 tab(s), 1 Refill(s) ; Ordering Provider:   Castillo Nogueira MD; Catalog Code:   gemfibrozil ; Order Dt/Tm:   9/17/2019 12:15:18 PM CDT          losartan  :   losartan ; Status:   Prescribed ; Ordered As Mnemonic:   losartan 50 mg oral tablet ; Simple Display Line:   1 tab(s), Oral, daily, 90 tab(s), 1 Refill(s) ; Ordering Provider:   Castillo Nogueira MD; Catalog Code:   losartan ; Order Dt/Tm:   9/16/2019 1:47:29 PM CDT          naproxen  :   naproxen ; Status:   Prescribed ; Ordered As Mnemonic:   naproxen sodium 220 mg oral tablet ; Simple Display Line:   440 mg, 2 tab(s), PO, q8hr, PRN: for pain, 60 tab(s), 0 Refill(s) ; Ordering Provider:   Castillo Nogueira MD; Catalog Code:   naproxen ; Order Dt/Tm:   5/10/2017 3:45:25 PM CDT          sildenafil  :   sildenafil ; Status:   Prescribed ; Ordered As Mnemonic:   sildenafil 20 mg oral tablet ; Simple Display Line:   40 mg, 2 tab(s), Oral, prn, as needed for planned sexual activity, PRN: Other (see comment), 20 tab(s), 5 Refill(s) ; Ordering Provider:   Castillo Nogueira MD; Catalog Code:   sildenafil ; Order Dt/Tm:   9/24/2019 2:56:03 PM CDT

## 2022-02-15 NOTE — RESULTS
-------------------------------- Original Report -------------------------------    Exam: MR Knee Without Contrast (Left) 1/31/2020 9:27 AM CST      INDICATION:  left knee   cant straighten, pain on inner part of left knee, felt a pop, a little   swollen, cant put a lot a weight/stand on it as it get very painful      COMPARISON:  No prior studies are available.      TECHNIQUE: A multiplanar multiple pulse sequence MRI examination is    performed.      FINDINGS:  Bone and chondral surface: The medial compartment    demonstrates areas of full-thickness cartilage loss centrally in both    medial femoral condyle and tibial plateau. There are moderately    advanced degenerative subchondral bony changes. Correlation changes in   the lateral compartment are present most part mild. Tricompartmental    degenerative osteophytes are present.      Effusions: There is a small joint effusion.      Cruciates: Anterior posterior cruciate ligaments both appear to be    intact.      Menisci: There is blunting irregularity of the posterior horn of the    medial meniscus consistent with a meniscal tear. There is a possible    displaced flap of meniscal cartilage superiorly adjacent to the medial   femoral condyle.      Lateral meniscus demonstrates extensive grade 2 signal changes without   definite evidence of a shahnaz tear.      Extensor mechanism: The principal extensor tendons are intact.    Patellar articular cartilage demonstrates advanced chondromalacic    changes in the lateral facet of the patella with some degenerative    subchondral changes. Patellar retinaculum are intact.      Collateral ligaments: Medial and lateral collateral ligaments appear    to be intact.      IMPRESSION:       1.  Complex tear of the posterior horn of the medial meniscus as    above. No definite lateral meniscal tear.      2.  Advanced chondromalacic changes of the medial compartment and    lateral patellar facet. Mild degenerative changes in the  lateral    compartment.      3.  Small joint effusion.      Corrected by: Nat Soliz      Electronically signed by: Dr. J Carlos Conrad MD 1/31/2020 10:31 AM CST      Study Location:  [facility]      Workstation:  [id number]          Read by: J Carlos Conrad M.D.  Reviewed and Electronically Signed by: J Carlos Conrad M.D.

## 2022-02-15 NOTE — TELEPHONE ENCOUNTER
YAZMIN GRANADOS. : 1960 MRN: [id number]  PHONE NOTE: The patient was given the results of his MR of the lumbar spine without contrast done on 2019.  At L2-3 there is moderate to severe posterior disc bulge with superimposed extruded fragment in the left lateral recess.  There is moderate central canal, lateral recess, and proximal neural foraminal stenosis, worse on the left than on the right.  Underlying endplate marrow signal change raises the possibility of instability.  There is also at L3-4 moderate posterior disc bulge with bilateral lateral recess and proximal neural foraminal stenosis.  At L4-5 there is moderate facet degenerative change and posterior disc bulging with right lateral recess stenosis.     The patient does have symptoms of spinal stenosis with neurologic symptoms in both lower extremities.   P: [facility] am [facility]     D:  2019  T:  2019 Castillo Nogueira MD/sq

## 2022-02-15 NOTE — PROGRESS NOTES
Patient:   YAZMIN GRANADOS            MRN: [id number]            FIN: [id number]               Age:   60 years     Sex:  Male     :  1960   Associated Diagnoses:   Osteoarthritis of left knee   Author:   Castillo Nogueira MD      Impression and Plan   Diagnosis     Osteoarthritis of left knee (XPC67-OG M17.12).     Condition:  Stable, no contraindication for general anesthesia or surgical procedure..    Orders     Orders   Charges (Evaluation and Management):  58565 office outpatient visit 25 minutes (Charge) (Order): Quantity: 1, Osteoarthritis of left knee.     Orders (Selected)   Outpatient Orders  Ordered (Dispatched)  CBC (h/h, RBC, indices, WBC, Plt)* ([facility]): Specimen Type: Blood, Collection Date: 10/27/20 11:13:00 CDT  Comprehensive Metabolic Panel* ([facility]): Specimen Type: Serum, Collection Date: 10/27/20 11:13:00 CDT  Lipid panel with reflex to direct ldl* ([facility]): Specimen Type: Serum, Collection Date: 10/27/20 11:13:00 CDT  TSH* ([facility]): Specimen Type: Serum, Collection Date: 10/27/20 11:13:00 CDT.        Preoperative Information   Indication for surgery:  Musculoskeletal disorder.         Associated symptoms: End Stage osteoarthritis of the left knee with intractable pain -  no longer responding to conservative treatments.    Accompanied by:  No one.    Source of history:  Self.    History limitation:  None.       Chief Complaint   Chief complaint discussed and confirmed correct.     10/27/2020 10:54 AM CDT  Pt here for WC pre op... DOS 20 at [facility] with Dr Tinoco for left knee replacement        Review of Systems   Constitutional:  Negative.    Eye:  Negative.    Ear/Nose/Mouth/Throat:  Negative.    Respiratory:  Negative.    Cardiovascular:  Negative.    Gastrointestinal:  Negative.    Genitourinary:  Negative.    Hematology/Lymphatics:  Negative.    Endocrine:  Negative.    Immunologic:  Negative.    Musculoskeletal:  Negative.    Integumentary:  Negative.    Neurologic:  Negative.    Psychiatric:   Negative.    All other systems reviewed and negative   No personal or family history of anesthesia reactions  No history of bleeding or blood clotting disorders  No history of heart murmur but takes prophylactic antibiotics with procedures due to right hip replacement.  No history of blood transfusion  No history of recent infectious contacts       Health Status   Allergies:    Allergic Reactions (Selected)  No Known Medication Allergies   Medications:  (Selected)   Prescriptions  Prescribed  atorvastatin 20 mg oral tablet: = 1 tab(s) ( 20 mg ), Oral, daily, # 90 tab(s), 1 Refill(s), Type: Maintenance, Pharmacy: [facility], 1 tab(s) Oral daily, 71, in, 08/17/20 15:40:00 CDT, Height Measured, 301, lb, 08/17/20 15:40:00 CDT, Weight Measured  gemfibrozil 600 mg oral tablet: See Instructions, Instructions: TAKE ONE (1) TABLET BY MOUTH TWO (2) TIMES, # 180 tab(s), 0 Refill(s), Type: Maintenance, Pharmacy: [facility], 71, in, 06/24/20 10:35:00 CDT, Height Measured, 301.6, lb, 06/24/20 10:35:00 CDT, Weight Measured  losartan 50 mg oral tablet: = 1 tab(s) ( 50 mg ), Oral, daily, # 90 tab(s), 1 Refill(s), Type: Maintenance, Pharmacy: [facility], 71, in, 08/17/20 15:40:00 CDT, Height Measured, 301, lb, 08/17/20 15:40:00 CDT, Weight Measured  sildenafil 20 mg oral tablet: = 2 tab(s) ( 40 mg ), Oral, prn, Instructions: as needed for planned sexual activity, PRN: Other (see comment), # 20 tab(s), 5 Refill(s), Type: Maintenance, Pharmacy: [facility], 2 tab(s) Oral prn,PRN:Other (see comment),Instr:as needed for pl...   Problem list:    All Problems  Anal Fissure / ICD-9-.0 / Confirmed  Benign essential HTN / SNOMED CT 4601135 / Confirmed  CAD (coronary artery disease) / SNOMED CT 7825775486 / Confirmed  Cervical radiculopathy at C6 / SNOMED CT 801936823 / Confirmed  Erectile Dysfunction / SNOMED CT 795411226 / Confirmed  Hypertriglyceridemia / ICD-9-.1 / Confirmed  Morbid obesity /  SNOMED CT 225594647 / Confirmed  Obese / ICD-9-.00 / Probable  Osteoarthritis / ICD-9-.90 / Confirmed  Inactive: GERD (Gastroesophageal Reflux Disease) / ICD-9-.81  Resolved: *Hospitalized@[facility] - Cellulitis LLE  Resolved: Arthroscopy of knee / SNOMED CT 268134208  Resolved: Arthroscopy of shoulder / SNOMED CT 770558826  Resolved: Hip replacement / SNOMED CT 5371279349  Resolved: Repair of diaphragmatic hiatal hernia / SNOMED CT 015823375  Resolved: Repair of rotator cuff of shoulder / SNOMED CT 6247715910  Resolved: Resection of clavicle / SNOMED CT 10866439  Canceled: Hyperlipemia / ICD-9-.4      Histories   Past Medical History:    Resolved  *Hospitalized@[facility] - Cellulitis LLE: Onset on 2012 at 52 years.  Resolved.  Repair of diaphragmatic hiatal hernia (641829919):  Resolved in  at 31 years.  Comments:  2010 CDT 4:26 PM CDT Danae Real  [facility]  Arthroscopy of knee (508682351):  Resolved in  at 29 years.  Comments:  2010 CDT 4:28 PM Danae Mccall  Right knee  Resection of clavicle (38177166):  Resolved in  at 35 years.  Comments:  2010 CDT 4:33 PM Danae Mccall  Left-distal  Arthroscopy of shoulder (898189192):  Resolved in  at 38 years.  Comments:  2010 CDT 4:34 PM Danae Mccall  right  Repair of rotator cuff of shoulder (0776112174):  Resolved in  at 44 years.  Comments:  2010 CDT 4:37 PM Danae Mccall  left  Hip replacement (7212703832):  Resolved in  at 46 years.  Comments:  2010 CDT 4:38 PM Danae Mccall  right   Family History:    Cancer  Father  Diabetes mellitus  Mother ()  Hypertension  Father  Heart disease  Father  Heart attack  Mother ()     Procedure history:    Rotator cuff repair (SNOMED CT 445785519) performed by Jacques Paris MD on 10/17/2016 at 56 Years.  Comments:  10/27/2016 8:27 AM CDT - [place] Holman.    10/6/2016 9:16 AM CDT - Jero BOWEN,  Castillo Paris  [facility]  10/17/2016   Right C5-7 Cervical foraminotomy in 2014 at 54 Years.  Comments:  1/15/2015 10:29 AM [facility]WARD - Castillo Nogueira MD, Dr. [facility]  Right Hip Arthroscopy and Psoas Tenotomy in 2013 at 53 Years.  Colonoscopy (SNOMED CT 927374254) in 2012 at 52 Years.  Arthroscopy of Shoulder in 2010 at 50 Years.  Comments:  6/29/2010 3:19 PM Castillo Kapoor MD, Dr.  C6-7 Laminectomy in 2010 at 50 Years.  Comments:  6/29/2010 3:20 PM Castillo Kapoor MD, Dr.  [facility]  hip replacement in 2007 at 47 Years.  Comments:  6/28/2010 4:56 PM CDT - Danae Cruz  Right  Repair of rotator cuff of shoulder (SNOMED CT 6176442969) in 2005 at 45 Years.  Comments:  6/28/2010 4:58 PM CDDanae Eng  left  Arthroscopy of shoulder (SNOMED CT 254499952) in 1999 at 39 Years.  Comments:  6/28/2010 4:55 PM CDDanae Eng  Right  Resection of clavicle (SNOMED CT 38152027) in 1996 at 36 Years.  Comments:  6/28/2010 5:00 PM CDDanae Eng  left distal  Repair of diaphragmatic hiatal hernia (SNOMED CT 106511528) in 1992 at 32 Years.  Comments:  6/28/2010 4:58 PM CDDanae Eng  [facility]  Arthroscopy of knee (SNOMED CT 535386683) in 1990 at 30 Years.  Comments:  6/28/2010 4:54 PM CDDanae Eng   Social History:        Alcohol Assessment: Current            Current, Beer (12 oz), 1-2 times per week, 3 drinks/episode average.      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  Lives with Self, Spouse.  2 children.  Living situation:               Home/Independent.      Exercise and Physical Activity Assessment: Regular  exercise            Exercise frequency: Daily.  Exercise type: Walking.        Physical Examination   Vital signs reviewed  and within acceptable limits    Vital Signs   10/27/2020 11:00 AM CDT Systolic Blood Pressure 138 mmHg  HI   10/27/2020 10:54 AM CDT Peripheral Pulse Rate 84 bpm    Systolic Blood Pressure 146 mmHg  HI    Diastolic Blood Pressure 78 mmHg    Mean Arterial Pressure 101 mmHg    Oxygen Saturation 98 %      Measurements from flowsheet : Measurements   10/27/2020 11:00 AM CDT Height Measured - Standard 71 in    Height/Length Estimated 71 in   10/27/2020 10:54 AM CDT Height Measured - Standard 71 in    Height/Length Estimated 71 in    Weight Measured - Standard 301 lb    BSA 2.61 m2    Body Mass Index 41.98 kg/m2  HI      General:  Alert and oriented, No acute distress.    Eye:  Pupils are equal, round and reactive to light, Extraocular movements are intact, Normal conjunctiva.    HENT:  Normocephalic, Tympanic membranes are clear, Normal hearing, Oral mucosa is moist, No pharyngeal erythema.    Neck:  Supple, Non-tender, No carotid bruit, No jugular venous distention, No lymphadenopathy, No thyromegaly.    Respiratory:  Lungs are clear to auscultation, Respirations are non-labored, Breath sounds are equal, Symmetrical chest wall expansion, No chest wall tenderness.    Cardiovascular:  Normal rate, Regular rhythm, No murmur, No gallop, Good pulses equal in all extremities, Normal peripheral perfusion, No edema.    Gastrointestinal:  Soft, Non-tender, Non-distended, Normal bowel sounds, No organomegaly.    Lymphatics:  No lymphadenopathy neck, axilla, groin.    Musculoskeletal:  mild limp due to end stage arthritis of left knee.    Integumentary:  Warm, Dry, Pink.    Neurologic:  Normal sensory, Normal motor function, No focal deficits, Cranial Nerves II-XII are grossly intact, Normal deep tendon reflexes.    Psychiatric:  Cooperative, Appropriate mood & affect, Normal judgment.

## 2022-02-15 NOTE — PROGRESS NOTES
Patient:   YAZMIN GRANADOS            MRN: [id number]            FIN: [id number]               Age:   60 years     Sex:  Male     :  1960   Associated Diagnoses:   Actinic keratosis   Author:   Castillo Nogueira MD      Impression and Plan   Diagnosis     Actinic keratosis (VKY26-MD L57.0).     Orders     Orders (Selected)   Outpatient Orders  Completed  80906 destruction benign lesions up to 14 (Charge): Quantity: 1, Actinic keratosis.        Visit Information   Visit type:  New symptom.    Accompanied by:  No one.    Source of history:  Self.    History limitation:  None.       Procedure   Procedure   Date/ Time:  2020 10:01:00 AM.     Informed consent: signed by patient.     Confirmed: patient, procedure, side, site, safety procedures followed.     Performed by: Castillo Nogueira MD.     Type of procedure: Cryotherapy.     Physical Exam: vital signs.     Location: site #  1, the left upper extremity, the right upper extremity, six lesions on dorsum of right hand, left hand and left forearm, size and depth (length  1.0  cm, width  1.0  cm, superficial).     Preparation and technique: local anesthesia 1% xylocaine without epinephrine 2.0 ml, technique Liquid Nitrogen Cryotherapy 3 x 30 seconds, hemostasis achieved using electrocautery, adhesive bandagedressing applied.     Procedure tolerated: well.     follow up in 2-4 weeks if lesion does not resolve.

## 2022-02-15 NOTE — TELEPHONE ENCOUNTER
Entered by Sarah Cervantes CMA on April 06, 2020 8:15:05 AM CDT  ---------------------  From: Sarah Cervantes CMA   To: [facility]    Sent: 4/6/2020 8:15:05 AM CDT  Subject: Medication Management     ** Submitted: **  Order:gemfibrozil (gemfibrozil 600 mg oral tablet)  1 tab(s)  Oral  bid  Qty:  180 tab(s)        Refills:  0          Substitutions Allowed     Route To Pharmacy - [facility]    Signed by Sarah Cervantes CMA  4/6/2020 1:14:00 PM    ** Submitted: **  Complete:gemfibrozil (gemfibrozil 600 mg oral tablet)   Signed by Sarah Cervantes CMA  4/6/2020 1:14:00 PM    ** Not Approved:  **  gemfibrozil (GEMFIBROZIL 600MG)  TAKE ONE (1) TABLET TWICE DAILY  Qty:  180 tab(s)        Days Supply:  30        Refills:  0          Substitutions Allowed     Route To Pharmacy - [facility]   Note from Pharmacy:  * * N O T I C E * * Last quantity doesn't match original quantity  Signed by Sarah Cervantes CMA            ** Patient matched by Sarah Cervantes CMA on 4/6/2020 8:14:38 AM CDT **      ------------------------------------------  From: [facility]  To: Castillo Nogueira MD  Sent: April 3, 2020 10:31:11 AM CDT  Subject: Medication Management  Due: April 4, 2020 10:31:11 AM CDT    ** On Hold Pending Signature **  Drug: gemfibrozil (gemfibrozil 600 mg oral tablet)  TAKE ONE (1) TABLET TWICE DAILY  Quantity: 180 tab(s)  Days Supply: 30  Refills: 0  Substitutions Allowed  Notes from Pharmacy:     Dispensed Drug: gemfibrozil (gemfibrozil 600 mg oral tablet)  TAKE ONE (1) TABLET TWICE DAILY  Quantity: 180 tab(s)  Days Supply: 30  Refills: 0  Substitutions Allowed  Notes from Pharmacy: * * N O T I C E * * Last quantity doesn't match original quantity  ------------------------------------------

## 2022-02-15 NOTE — RESULTS
-------------------------------- Original Report -------------------------------    Exam: MR Lumbar Spine Without Contrast (Unpaired)      INDICATION:  bilateral upper leg tingling and numbness worse w/walking   or standing/no injury      COMPARISON:  No prior studies are available.      TECHNIQUE: A multiplanar multiple pulse sequence MRI examination is    performed.      FINDINGS:  Lumbar vertebral body height and alignment is normal. There   is a minimal levoscoliotic curvature. Endplate marrow signal changes    evident at L2-3. No additional focus of abnormal bone marrow edema is    evident. The intervertebral disc spaces show diffuse dehydration and    narrowing of height. The L5-S1 level shows facet degenerative change    without stenosis. The L4-5 level shows moderate facet degenerative    change and posterior disc bulging with right lateral recess stenosis.    The L3-4 level shows moderate posterior disc bulging, hypertrophy of    the ligamentum flavum, and facet degenerative change with bilateral    lateral recess and proximal neural foraminal stenosis. There is    borderline central stenosis. The L2-3 level shows more severe    posterior disc bulging, with moderate central canal, lateral recess,    and proximal neural foraminal stenosis. There is a superimposed    extruded disc fragment to the left. The L1-2 level shows mild    posterior disc bulging. The conus medullaris is normal in termination    and morphology. No paraspinal soft tissue abnormality is present.      IMPRESSION:       1.  At L2-3, there is moderate to severe posterior disc bulge with a    superimposed extruded fragment in the left lateral recess. There is    moderate central canal, lateral recess, and proximal neural foraminal    stenosis, worse to the left. Underlying endplate marrow signal change    does raise the possibility of instability.   2.  At L3-4, there is moderate posterior disc bulging with bilateral    lateral recess and  proximal neural foraminal stenosis.   3.  At L4-5, there is moderate facet degenerative change and posterior   disc bulging with right lateral recess stenosis.      Electronically signed by: Dr Ghanshyam Dubose MD 9/23/2019 3:07 PM CDT      Study Location:  [facility]      Workstation:  [facility]          Read by: Ghanshyam Dubose M.D.  Reviewed and Electronically Signed by: Ghanshyam Dubose M.D.

## 2022-02-15 NOTE — PROGRESS NOTES
Patient:   YAZMIN GRANADOS            MRN: [id number]            FIN: [id number]               Age:   59 years     Sex:  Male     :  1960   Associated Diagnoses:   Acute medial meniscus tear of left knee   Author:   Castillo Nogueira MD      Visit Information      Date of Service: 2020 12:56 pm  Performing Location: [facility]  Encounter#: [id number]      Primary Care Provider (PCP):  Castillo Nogueira MD    NPI# [id number]      Impression and Plan   Diagnosis     Acute medial meniscus tear of left knee (KPH92-OF S83.242A).     Course:  Worsening.    Orders     Orders   Charges (Evaluation and Management):  14978 office outpatient visit 15 minutes (Charge) (Order): Quantity: 1, Acute medial meniscus tear of left knee.     Orders (Selected)   Outpatient Orders  Ordered  MRI Knee Left (Request): Acute medial meniscus tear of left knee.        Chief Complaint       2020 12:58 PM CST Pt here for work. comp injury... twisted right knee...DOI 20 2:48 PM CST Pt here for bx of spot on hand         History of Present Illness             The patient presents with twisted left knee at work today.  Now has pain, swelling and stiffness on the medial aspect of the knee.  Walking with a pronounced limp and unable to flex at the knee..  Exacerbating factors consist of movement and walking.  Relieving factors consist of analgesics, cold application and rest.  Associated symptoms consist of decreased range of motion and joint stiffness.        Review of Systems   Constitutional:  Negative.    Eye:  Negative.    Ear/Nose/Mouth/Throat:  Negative.    Respiratory:  Negative.    Cardiovascular:  Negative.    Gastrointestinal:  Negative.    Genitourinary:  Negative.    Hematology/Lymphatics:  Negative.    Endocrine:  Negative.    Immunologic:  Negative.    Musculoskeletal:  Negative except as documented in history of present illness.    Integumentary:  Negative.    Neurologic:  Negative.     Psychiatric:  Negative.    All other systems reviewed and negative      Physical Examination   Vital Signs   1/28/2020 12:58 PM CST Peripheral Pulse Rate 71 bpm    Systolic Blood Pressure 136 mmHg  HI    Diastolic Blood Pressure 70 mmHg    Mean Arterial Pressure 92 mmHg    Oxygen Saturation 98 %   1/27/2020 2:48 PM CST Peripheral Pulse Rate 70 bpm    Systolic Blood Pressure 122 mmHg    Diastolic Blood Pressure 62 mmHg    Mean Arterial Pressure 82 mmHg    Oxygen Saturation 96 %      Measurements from flowsheet : Measurements   1/28/2020 12:58 PM CST Height Measured - Standard 71 in    Weight Measured - Standard 309 lb    BSA 2.65 m2    Body Mass Index 43.09 kg/m2  HI   1/27/2020 2:48 PM CST Height Measured - Standard 71 in    Weight Measured - Standard 309 lb    BSA 2.65 m2    Body Mass Index 43.09 kg/m2  HI      General:  Alert and oriented X 3, No acute distress, Warm, Pink, Intact.         Appearance: Within normal limits, Well nourished, Calm.         Hydration: Within normal limits.         Psych: Within normal limits, Appropriate mood and affect, Cooperative, Normal judgment.    Musculoskeletal:       Lower extremity exam: Knee ( Left, Medial, No deformity, No erythema, No ecchymosis, Effusion, Swelling, Tenderness, No wound, Pain, No numbness, No tingling, Strength  5 /5, Range of motion ( Diminished, Flexion, Restricted by pain ), Disha test ( Positive ( On the left ) ) ).

## 2022-02-15 NOTE — TELEPHONE ENCOUNTER
Entered by Sarah Cervantes CMA on September 16, 2019 1:47:39 PM CDT  ---------------------  From: Sarah Cervantes CMA   To: [facility]    Sent: 9/16/2019 1:47:39 PM CDT  Subject: Medication Management     ** Submitted: **  Order:losartan (losartan 50 mg oral tablet)  1 tab(s)  Oral  daily  Qty:  90 tab(s)        Days Supply:  45        Refills:  1          Substitutions Allowed     Route To Pharmacy [facility]    Signed by Sarah Cervantes CMA  9/16/2019 1:47:00 PM    ** Submitted: **  Complete:losartan (losartan 50 mg oral tablet)   Signed by Sarah Cervantes CMA  9/16/2019 1:47:00 PM    ** Not Approved:  **  losartan (LOSARTAN POT 50MG)  TAKE ONE (1) TABLET DAILY  Qty:  90 tab(s)        Days Supply:  45        Refills:  1          Substitutions Allowed     Route To [facility]   Note from Pharmacy:  Generic For:COZAAR 50MG   N O T I C E    Last quantity doesn't match original quantity  Signed by Sarah Cervantes CMA            ** Patient matched by Sarah Cervatnes CMA on 9/16/2019 1:37:07 PM CDT **      ------------------------------------------  From: [facility]  To: Castillo Nogueira MD  Sent: September 16, 2019 10:28:43 AM CDT  Subject: Medication Management  Due: September 17, 2019 10:28:43 AM CDT    ** On Hold Pending Signature **  Drug: losartan (losartan 50 mg oral tablet)  TAKE ONE (1) TABLET DAILY  Quantity: 90 tab(s)     Days Supply: 45        Refills: 1  Substitutions Allowed  Notes from Pharmacy: Generic For:COZAAR 50MG   N O T I C E    Last quantity doesn't match original quantity    Dispensed Drug: losartan (losartan 50 mg oral tablet)  TAKE ONE (1) TABLET DAILY  Quantity: 90 tab(s)     Days Supply: 45        Refills: 1  Substitutions Allowed  Notes from Pharmacy: Generic For:COZAAR 50MG   N O T I C E    Last quantity doesn't match original quantity  ------------------------------------------

## 2022-02-15 NOTE — LETTER
(Inserted Image. Unable to display)   December 18, 2019        YAZMIN GRANADOS   [address] [id number]        Dear YAZMIN,      Thank you for selecting [facility] for your healthcare needs.    Our records indicate you are due for the following services:     Fasting Lab Tests ~ Please do not eat or drink anything 10 hours prior to your scheduled appointment time.  (Water and any medications that you may need are allowed unless directed otherwise.)    If you had your labs done at another facility or with Direct Access Lab Testing at [facility], please bring in a copy of the results to your next visit, mail a copy, or drop off a copy of your results to your Healthcare Provider.    To schedule an appointment or if you have further questions, please contact your primary clinic:   [facility]       [phone]   [facility]       [phone]              [facility]     [phone]      Powered by [employer]    Sincerely,    Castillo Nogueira M.D.

## 2022-02-15 NOTE — NURSING NOTE
Comprehensive Intake Entered On:  9/16/2019 8:54 AM CDT    Performed On:  9/16/2019 8:51 AM CDT by Sarah Cervantes CMA               Summary   Chief Complaint :   Pt here for med ck   Weight Measured :   309 lb(Converted to: 309 lb 0 oz, 140.16 kg)    Height Measured :   71 in(Converted to: 5 ft 11 in, 180.34 cm)    Body Mass Index :   43.09 kg/m2 (HI)    Body Surface Area :   2.65 m2   Systolic Blood Pressure :   122 mmHg   Diastolic Blood Pressure :   82 mmHg (HI)    Mean Arterial Pressure :   95 mmHg   Peripheral Pulse Rate :   66 bpm   Oxygen Saturation :   97 %   Race :      Languages :   English   Ethnicity :   Not  or    Sarah Cervantes CMA - 9/16/2019 8:51 AM CDT   Health Status   Allergies Verified? :   Yes   Medication History Verified? :   Yes   Immunizations Current :   Yes   Medical History Verified? :   Yes   Pre-Visit Planning Status :   Completed   Tobacco Use? :   Never smoker   Sarah Cervantes CMA - 9/16/2019 8:51 AM CDT   Consents   Consent for Immunization Exchange :   Consent Granted   Consent for Immunizations to Providers :   Consent Granted   Sarah Cervantes CMA - 9/16/2019 8:51 AM CDT   Meds / Allergies   (As Of: 9/16/2019 8:54:10 AM CDT)   Allergies (Active)   No known allergies  Estimated Onset Date:   Unspecified ; Created By:   Myriam Graves; Reaction Status:   Active ; Category:   Drug ; Substance:   No known allergies ; Type:   Allergy ; Updated By:   Myriam Gravse; Reviewed Date:   9/16/2019 8:53 AM CDT        Medication List   (As Of: 9/16/2019 8:54:10 AM CDT)   Prescription/Discharge Order    atorvastatin  :   atorvastatin ; Status:   Prescribed ; Ordered As Mnemonic:   atorvastatin 20 mg oral tablet ; Simple Display Line:   20 mg, 1 tab(s), po, daily, 90 tab(s), 1 Refill(s) ; Ordering Provider:   Castillo Nogueira MD; Catalog Code:   atorvastatin ; Order Dt/Tm:   2/5/2019 8:41:03 AM          losartan  :   losartan ; Status:   Prescribed ; Ordered As  Mnemonic:   losartan 50 mg oral tablet ; Simple Display Line:   50 mg, 1 tab(s), PO, Daily, 90 tab(s), 1 Refill(s) ; Ordering Provider:   Castillo Nogueira MD; Catalog Code:   losartan ; Order Dt/Tm:   2/5/2019 8:41:13 AM          naproxen  :   naproxen ; Status:   Prescribed ; Ordered As Mnemonic:   naproxen sodium 220 mg oral tablet ; Simple Display Line:   440 mg, 2 tab(s), PO, q8hr, PRN: for pain, 60 tab(s), 0 Refill(s) ; Ordering Provider:   Castillo Nogueira MD; Catalog Code:   naproxen ; Order Dt/Tm:   5/10/2017 3:45:25 PM

## 2022-02-15 NOTE — PROGRESS NOTES
Patient:   YAZMIN GRANADOS            MRN: [id number]            FIN: [id number]               Age:   59 years     Sex:  Male     :  1960   Associated Diagnoses:   Changing skin lesion   Author:   Castillo Nogueira MD      Impression and Plan   Diagnosis     Changing skin lesion (KHW54-ZX L98.9).     Orders      (Selected)   Outpatient Orders  Ordered (Dispatched)  Tissue pathology* ([facility]): Specimen Type: Miscellaneous Specimen, Collection Date: 20 15:33:00 CST  Order  [id number] shaving skin les 1 trunk/arm/leg diam 0.5cm/less than (Charge): Quantity: 1, Changing skin lesion.     Counseled:  Patient, Regarding diagnosis, Regarding treatment.       Visit Information      Date of Service: 2020 02:47 pm  Performing Location: [facility]  Encounter#: [id number]      Primary Care Provider (PCP):  Castillo Nogueira MD    NPI# [id number]   Visit type:  New symptom.    Accompanied by:  No one.    Source of history:  Self.    History limitation:  None.       Procedure   Biopsy procedure   Date/ Time:  2020 3:34:00 PM.     Confirmed: patient, procedure, side, site, safety procedures followed.     Performed by: Castillo Nogueira MD.     Informed consent: signed by patient.     Indication: abnormal physical findings, suspected malignancy.     Preparation and technique: skin prepped (in usual sterile fashion, with alcohol), sterile preparation of site, local anesthesia 1% lidocaine without epinephrine 1.0  ml, technique (shave biopsy, number 10 scalpel), hemostasis achieved using electrocautery.     Site #  1: right, hand, size and depth (length  0.5  cm, width  0.5  cm, superficial), specimen sent to pathology in preservative.     Completion: 1  lesions biopsied, estimated blood loss minimal, dressing applied adhesive strip.     Procedure tolerated: well.     wound care instructions given.

## 2022-02-15 NOTE — TELEPHONE ENCOUNTER
YAZMIN GRANADOS : 1960 MRN: [id number]  PHONE NOTE: The patient was given the results of his MRI of the left knee done on 2020.  He has a complex tear of the posterior horn of the medial meniscus.  He also has advanced chondromalacia changes of the medial compartment and lateral patellar facet.    P: I have recommended a consultation with Dr. Jacques Paris of [facility] and would anticipate need for arthroscopic surgery in the near future.     D:  2020  T:  2020 Castillo Nogueira MD/sq

## 2022-02-15 NOTE — PROGRESS NOTES
Patient:   YAZMIN GRANADOS            MRN: [id number]            FIN: [id number]               Age:   57 years     Sex:  Male     :  1960   Associated Diagnoses:   Rotator cuff syndrome of right shoulder   Author:   Modesto Barbosa MD      Chief Complaint   2017 2:23 PM CDT     Injured right shoulder at work at 0330 this morning.        History of Present Illness   chief complaint and symptoms as noted above confirmed with patient   pulling hard on reel when had severe pain right shoulder  had to stop   hard to use  previous surgery on it      Review of Systems   Constitutional:  Negative except as documented in history of present illness.    Musculoskeletal:  Negative except as documented in history of present illness.    Integumentary:  Negative.    Neurologic:  Negative.       Physical Examination   Vital Signs   2017 2:23 PM CDT Temperature Tympanic 98.9 DegF    Peripheral Pulse Rate 74 bpm    Systolic Blood Pressure 116 mmHg    Diastolic Blood Pressure 80 mmHg    Mean Arterial Pressure 92 mmHg      Measurements from flowsheet : Measurements   2017 2:23 PM CDT Height Measured - Standard 71 in    Weight Measured - Standard 302.8 lb    BSA 2.62 m2    Body Mass Index 42.23 kg/m2      General:  Alert and oriented, No acute distress.    Musculoskeletal:       Upper extremity exam: Shoulder ( Right, Rotator cuff, Tenderness, Range of motion ( Diminished ), very limited motion because of pain   passively can move to 90deg flex and abd  cms otherwise intact   ).    Neurologic:  Alert, Oriented.       Impression and Plan   Diagnosis     Rotator cuff syndrome of right shoulder (HUX35-CD M75.101).     Course:  Not well controlled.    Plan:  work restrict  2 week fu.         Refer to: Physical therapy.    Patient Instructions:       Counseled: Patient, Regarding treatment, Regarding diagnosis, Regarding medications, Activity.

## 2022-02-15 NOTE — PROGRESS NOTES
Patient:   YAZMIN GRANADOS            MRN: [id number]            FIN: [id number]               Age:   59 years     Sex:  Male     :  1960   Associated Diagnoses:   Well adult exam   Author:   Castillo Nogueira MD      Impression and Plan   Diagnosis     Well adult exam (IUC95-QI Z00.00).     Plan:  Eye exam yearly: UTD  Dental exam yearly: UTD  Hearing exam yearly: UTD  Depression Screening yearly: UTD  Prostate screen male every year: ordered  Colonoscopy every 10 years: UTD   Influenza vaccine yearly: UTD  Adacel every ten years: UTD  Lipid screen every five years: ordered  Fasting Glucose every five years: ordered.    Orders     Orders   Charges (Evaluation and Management):  44700 periodic preventive med est patient 40-64yrs (Charge) (Order): Quantity: 1, Well adult exam.     Orders (Selected)   Outpatient Orders  Ordered (In Transit)  Comprehensive Metabolic Panel* ([facility]): Specimen Type: Serum, Collection Date: 20 8:39:00 CST  Lipid panel with reflex to direct ldl* ([facility]): Specimen Type: Serum, Collection Date: 20 8:39:00 CST  PSA, Total (Room)* ([facility]): Specimen Type: Serum, Collection Date: 20 8:39:00 CST.        Visit Information      Date of Service: 2020 08:12 am  Performing Location: [facility]  Encounter#: [id number]      Primary Care Provider (PCP):  Castillo Nogueira MD    NPI# [id number]      Referring Provider:  Castillo Nogueira MD, NPI# [id number]   Visit type:  Annual exam.    Accompanied by:  No one.    Source of history:  Self.    History limitation:  None.       Chief Complaint   Chief complaint discussed and confirmed correct.     2020 8:07 AM CST    Pt here for px        Well Adult History   Well Adult History             The patient presents for well adult exam.  The patient's general health status is described as good.  The patient's diet is described as balanced.  Exercise: routine.  Associated symptoms consist of none.  Medical encounters: none.   Compliance problems: none.        Review of Systems   Constitutional:  Negative.    Eye:  Negative.    Ear/Nose/Mouth/Throat:  Negative.    Respiratory:  Negative.    Cardiovascular:  Negative.    Gastrointestinal:  Negative.    Genitourinary:  Negative.    Hematology/Lymphatics:  Negative.    Endocrine:  Negative.    Immunologic:  Negative.    Musculoskeletal:  Negative.    Integumentary:  Skin lesion, recommended biopsy in the near future.    Neurologic:  Negative.    Psychiatric:  Negative.    All other systems reviewed and negative      Health Status   Allergies:    Allergic Reactions (Selected)  No known allergies   Medications:  (Selected)   Prescriptions  Prescribed  atorvastatin 20 mg oral tablet: = 1 tab(s) ( 20 mg ), po, daily, # 90 tab(s), 1 Refill(s), Type: Maintenance, Pharmacy: [facility], 1 tab(s) Oral daily  gemfibrozil 600 mg oral tablet: = 1 tab(s) ( 600 mg ), Oral, bid, # 180 tab(s), 1 Refill(s), Type: Maintenance  losartan 50 mg oral tablet: = 1 tab(s), Oral, daily, # 90 tab(s), 1 Refill(s), Type: Maintenance, Pharmacy: [facility]  naproxen sodium 220 mg oral tablet: 2 tab(s) ( 440 mg ), PO, q8hr, PRN: for pain, # 60 tab(s), 0 Refill(s), Type: Maintenance, OTC (Rx)  sildenafil 20 mg oral tablet: = 2 tab(s) ( 40 mg ), Oral, prn, Instructions: as needed for planned sexual activity, PRN: Other (see comment), # 20 tab(s), 5 Refill(s), Type: Maintenance, Pharmacy: [facility], 2 tab(s) Oral prn,PRN:Other (see comment),Instr:as needed for pl...   Problem list:    All Problems  Anal Fissure / ICD-9-.0 / Confirmed  Benign essential HTN / SNOMED CT 9301792 / Confirmed  CAD (coronary artery disease) / SNOMED CT 4111702372 / Confirmed  Cervical radiculopathy at C6 / SNOMED CT 169187305 / Confirmed  Erectile Dysfunction / SNOMED CT 084916714 / Confirmed  Hypertriglyceridemia / ICD-9-.1 / Confirmed  Morbid obesity / SNOMED CT 788302287 / Confirmed  Obese / ICD-9-.00 /  Probable  Osteoarthritis / ICD-9-.90 / Confirmed  Inactive: GERD (Gastroesophageal Reflux Disease) / ICD-9-.81  Resolved: *Hospitalized@[facility] - Cellulitis LLE  Resolved: Arthroscopy of knee / SNOMED CT 466907923  Resolved: Arthroscopy of shoulder / SNOMED CT 654283493  Resolved: Hip replacement / SNOMED CT 0971936709  Resolved: Repair of diaphragmatic hiatal hernia / SNOMED CT 909538159  Resolved: Repair of rotator cuff of shoulder / SNOMED CT 5549194719  Resolved: Resection of clavicle / SNOMED CT 92634245  Canceled: Hyperlipemia / ICD-9-.4      Histories   Past Medical History:    Resolved  *Hospitalized@[facility] - Cellulitis LLE: Onset on 2012 at 52 years.  Resolved.  Repair of diaphragmatic hiatal hernia (596752461):  Resolved in  at 31 years.  Comments:  2010 CDT 4:26 PM CDT - Danae Cruz  [facility] [employer]  Arthroscopy of knee (066453192):  Resolved in  at 29 years.  Comments:  2010 CDT 4:28 PM CDT Danea Real  Right knee  Resection of clavicle (88660734):  Resolved in  at 35 years.  Comments:  2010 CDT 4:33 PM CDDanae Eng  Left-distal  Arthroscopy of shoulder (783193390):  Resolved in  at 38 years.  Comments:  2010 CDT 4:34 PM CDDanae Eng  right  Repair of rotator cuff of shoulder (9981352122):  Resolved in  at 44 years.  Comments:  2010 CDT 4:37 PM CDDanae Eng  left  Hip replacement (7402736799):  Resolved in  at 46 years.  Comments:  2010 CDT 4:38 PM Danae Mccall  right   Family History:    Cancer  Father  Diabetes mellitus  Mother ()  Hypertension  Father  Heart disease  Father  Heart attack  Mother ()     Procedure history:    Rotator cuff repair (SNOMED CT 334005031) performed by Jacques Paris MD on 10/17/2016 at 56 Years.  Comments:  10/27/2016 8:27 AM CDT - [place] Holman.    10/6/2016 9:16 AM CDT - Jero BOWEN, Castillo Paris  [facility]  10/17/2016   Right C5-7 Cervical  foraminotomy in 2014 at 54 Years.  Comments:  1/15/2015 10:29 AM [facility] - Castillo Nogueira MD, Dr. [facility]  Right Hip Arthroscopy and Psoas Tenotomy in 2013 at 53 Years.  Colonoscopy (SNOMED CT 837207906) in 2012 at 52 Years.  Arthroscopy of Shoulder in 2010 at 50 Years.  Comments:  6/29/2010 3:19 PM Castillo Kapoor MD, Dr.  C6-7 Laminectomy in 2010 at 50 Years.  Comments:  6/29/2010 3:20 PM Castillo Kapoor MD, Dr.  [facility]  hip replacement in 2007 at 47 Years.  Comments:  6/28/2010 4:56 PM CDDanae Eng  Right  Repair of rotator cuff of shoulder (SNOMED CT 2922920807) in 2005 at 45 Years.  Comments:  6/28/2010 4:58 PM Danae Mccall  left  Arthroscopy of shoulder (SNOMED CT 773750142) in 1999 at 39 Years.  Comments:  6/28/2010 4:55 PM CDDanae Eng  Right  Resection of clavicle (SNOMED CT 12606164) in 1996 at 36 Years.  Comments:  6/28/2010 5:00 PM Danae Mccall  left distal  Repair of diaphragmatic hiatal hernia (SNOMED CT 398985519) in 1992 at 32 Years.  Comments:  6/28/2010 4:58 PM Danae Mccall  [facility] [employer]  Arthroscopy of knee (SNOMED CT 239364630) in 1990 at 30 Years.  Comments:  6/28/2010 4:54 PM CDDanae Eng   Social History:        Alcohol Assessment: Current            Current, Beer (12 oz), 1-2 times per week, 3 drinks/episode average.      Tobacco Assessment: Denies Tobacco Use      Substance Abuse Assessment: Denies Substance Abuse      Employment and Education Assessment            Employed                     Comments:                      01/15/2015 - [employer], [employer]      Home and Environment Assessment            Marital status:  (Living together).  Lives with Self, Spouse.  2 children.  Living situation:               Home/Independent.      Exercise and Physical Activity Assessment: Regular exercise            Exercise frequency: Daily.  Exercise  type: Walking.        Physical Examination   Vital signs reviewed  and within acceptable limits    Vital Signs   1/23/2020 8:07 AM CST Peripheral Pulse Rate 78 bpm    Systolic Blood Pressure 128 mmHg    Diastolic Blood Pressure 72 mmHg    Mean Arterial Pressure 91 mmHg    Oxygen Saturation 97 %      Measurements from flowsheet : Measurements   1/23/2020 8:07 AM CST Height Measured - Standard 71 in    Weight Measured - Standard 309 lb    BSA 2.65 m2    Body Mass Index 43.09 kg/m2  HI      General:  Alert and oriented, No acute distress.    Eye:  Pupils are equal, round and reactive to light, Extraocular movements are intact, Normal conjunctiva.    HENT:  Normocephalic, Tympanic membranes are clear, Normal hearing, Oral mucosa is moist, No pharyngeal erythema.    Neck:  Supple, Non-tender, No carotid bruit, No jugular venous distention, No lymphadenopathy, No thyromegaly.    Respiratory:  Lungs are clear to auscultation, Respirations are non-labored, Breath sounds are equal, Symmetrical chest wall expansion, No chest wall tenderness.    Cardiovascular:  Normal rate, Regular rhythm, No murmur, No gallop, Good pulses equal in all extremities, Normal peripheral perfusion, No edema.    Gastrointestinal:  Soft, Non-tender, Non-distended, Normal bowel sounds, No organomegaly.    Lymphatics:  No lymphadenopathy neck, axilla, groin.    Musculoskeletal:  Normal range of motion, Normal strength, No tenderness, No swelling, No deformity, Normal gait.    Integumentary:  Warm, Dry, Pink, lesion on dorsum of hand.    Neurologic:  Normal sensory, Normal motor function, No focal deficits, Cranial Nerves II-XII are grossly intact, Normal deep tendon reflexes.    Psychiatric:  Cooperative, Appropriate mood & affect, Normal judgment.

## 2022-03-02 NOTE — LETTER
(Inserted Image. Unable to display)   February 16, 2022  YAZMIN GRANADOS   [address] [address]        Dear YAZMIN,    Thank you for selecting [facility] for your healthcare needs.    Our records indicate you are due for the following services:     Hypertension check ~ Please remember to bring your at-home blood pressure readings with you to your appointment.   Fasting Lab Tests ~ Please do not eat or drink anything 10 hours prior to your scheduled appointment time.  (Water and any medications that you may need are allowed unless directed otherwise.)     If you had your labs done at another facility or with Direct Access Lab Testing at [facility], please bring in a copy of the results to your next visit, mail a copy, or drop off a copy of your results to your Healthcare Provider.     (FYI   Regarding office visits: In some instances, a video visit or telephone visit may be offered as an option.)    To schedule an appointment or if you have further questions, please contact your clinic at [phone].    Powered by [employer]    Sincerely,    Healthcare Providers of [facility]

## 2022-06-16 NOTE — PROCEDURES
Accession Number:       [id number]  PATHOLOGIST:     Bhupinder Pendleton Jr., M.D., Board Certified in Anatomic Pathology, Clinical Pathology and Cytopathology, 1 [phone] (electronic signature)  A SOURCE:     Skin, right hand, biopsy  A GROSS DESCRIPTION:     See comment       Specimen is received in formalin, labeled with       multiple patient identifier(s) and consists of       one piece from a skin biopsy measuring 0.6 x 0.6       x 0.1 cm, irregular in shape and yellow-white in       color. The margins are inked green. The specimen       is bisected and entirely submitted in one       cassette(s).         Gross exam(s) performed at: [address] [address]         : YOGESH OWENS MD  A DIAGNOSIS:     Very superficial biopsy showing atypical squamous proliferation, extending to the biopsy margins. See comment.  A COMMENT:     The findings may represent hypertrophic actinic keratosis; however, squamous cell carcinoma cannot be entirely excluded. The base of the lesion is not identified. Appropriate clinical treatment is recommended.